# Patient Record
Sex: FEMALE | Race: WHITE | Employment: OTHER | URBAN - METROPOLITAN AREA
[De-identification: names, ages, dates, MRNs, and addresses within clinical notes are randomized per-mention and may not be internally consistent; named-entity substitution may affect disease eponyms.]

---

## 2016-12-21 LAB
VITAMIN D 25-HYDROXY: 36
VITAMIN D2, 25 HYDROXY: NORMAL
VITAMIN D3,25 HYDROXY: NORMAL

## 2017-01-04 DIAGNOSIS — Z12.11 SPECIAL SCREENING FOR MALIGNANT NEOPLASMS, COLON: ICD-10-CM

## 2017-01-04 LAB
HEMOCCULT STL QL: NORMAL

## 2017-01-04 PROCEDURE — 82270 OCCULT BLOOD FECES: CPT | Performed by: INTERNAL MEDICINE

## 2017-01-19 ENCOUNTER — TELEPHONE (OUTPATIENT)
Dept: INTERNAL MEDICINE | Age: 72
End: 2017-01-19

## 2017-02-23 RX ORDER — LEVOTHYROXINE SODIUM 0.05 MG/1
TABLET ORAL
Qty: 90 TABLET | Refills: 3 | Status: SHIPPED | OUTPATIENT
Start: 2017-02-23 | End: 2017-12-02 | Stop reason: SDUPTHER

## 2017-03-29 ENCOUNTER — TELEPHONE (OUTPATIENT)
Dept: INTERNAL MEDICINE | Age: 72
End: 2017-03-29

## 2017-04-07 RX ORDER — LISINOPRIL 40 MG/1
TABLET ORAL
Qty: 180 TABLET | Refills: 3 | Status: ON HOLD | OUTPATIENT
Start: 2017-04-07 | End: 2017-07-11

## 2017-05-08 ENCOUNTER — TELEPHONE (OUTPATIENT)
Dept: RHEUMATOLOGY | Age: 72
End: 2017-05-08

## 2017-05-08 RX ORDER — ATORVASTATIN CALCIUM 80 MG/1
TABLET, FILM COATED ORAL
Qty: 90 TABLET | Refills: 3 | Status: SHIPPED | OUTPATIENT
Start: 2017-05-08 | End: 2018-05-10 | Stop reason: SDUPTHER

## 2017-05-10 ENCOUNTER — OFFICE VISIT (OUTPATIENT)
Dept: RHEUMATOLOGY | Age: 72
End: 2017-05-10

## 2017-05-10 VITALS
DIASTOLIC BLOOD PRESSURE: 76 MMHG | BODY MASS INDEX: 29.66 KG/M2 | TEMPERATURE: 98.3 F | WEIGHT: 178 LBS | HEART RATE: 74 BPM | SYSTOLIC BLOOD PRESSURE: 124 MMHG | HEIGHT: 65 IN

## 2017-05-10 DIAGNOSIS — Z79.1 NSAID LONG-TERM USE: ICD-10-CM

## 2017-05-10 DIAGNOSIS — M15.9 PRIMARY OSTEOARTHRITIS INVOLVING MULTIPLE JOINTS: Primary | ICD-10-CM

## 2017-05-10 PROCEDURE — G8427 DOCREV CUR MEDS BY ELIG CLIN: HCPCS | Performed by: INTERNAL MEDICINE

## 2017-05-10 PROCEDURE — 4004F PT TOBACCO SCREEN RCVD TLK: CPT | Performed by: INTERNAL MEDICINE

## 2017-05-10 PROCEDURE — 3017F COLORECTAL CA SCREEN DOC REV: CPT | Performed by: INTERNAL MEDICINE

## 2017-05-10 PROCEDURE — 1123F ACP DISCUSS/DSCN MKR DOCD: CPT | Performed by: INTERNAL MEDICINE

## 2017-05-10 PROCEDURE — G8399 PT W/DXA RESULTS DOCUMENT: HCPCS | Performed by: INTERNAL MEDICINE

## 2017-05-10 PROCEDURE — G8420 CALC BMI NORM PARAMETERS: HCPCS | Performed by: INTERNAL MEDICINE

## 2017-05-10 PROCEDURE — 1090F PRES/ABSN URINE INCON ASSESS: CPT | Performed by: INTERNAL MEDICINE

## 2017-05-10 PROCEDURE — 3014F SCREEN MAMMO DOC REV: CPT | Performed by: INTERNAL MEDICINE

## 2017-05-10 PROCEDURE — 4040F PNEUMOC VAC/ADMIN/RCVD: CPT | Performed by: INTERNAL MEDICINE

## 2017-05-10 PROCEDURE — 99213 OFFICE O/P EST LOW 20 MIN: CPT | Performed by: INTERNAL MEDICINE

## 2017-06-01 ENCOUNTER — TELEPHONE (OUTPATIENT)
Dept: INTERNAL MEDICINE | Age: 72
End: 2017-06-01

## 2017-06-08 ENCOUNTER — TELEPHONE (OUTPATIENT)
Dept: INTERNAL MEDICINE | Age: 72
End: 2017-06-08

## 2017-06-13 ENCOUNTER — TELEPHONE (OUTPATIENT)
Dept: INTERNAL MEDICINE | Age: 72
End: 2017-06-13

## 2017-06-21 ENCOUNTER — TELEPHONE (OUTPATIENT)
Dept: INTERNAL MEDICINE | Age: 72
End: 2017-06-21

## 2017-06-21 DIAGNOSIS — R05.9 COUGH: Primary | ICD-10-CM

## 2017-06-22 ENCOUNTER — TELEPHONE (OUTPATIENT)
Dept: INTERNAL MEDICINE | Age: 72
End: 2017-06-22

## 2017-06-22 RX ORDER — ALPRAZOLAM 1 MG/1
1 TABLET ORAL 2 TIMES DAILY
Qty: 22 TABLET | Refills: 0 | Status: CANCELLED | OUTPATIENT
Start: 2017-06-22 | End: 2017-07-22

## 2017-06-22 RX ORDER — ALPRAZOLAM 1 MG/1
1 TABLET ORAL 2 TIMES DAILY
Qty: 22 TABLET | Refills: 0 | Status: SHIPPED | OUTPATIENT
Start: 2017-06-22 | End: 2017-07-22

## 2017-06-28 RX ORDER — ATENOLOL 50 MG/1
TABLET ORAL
Qty: 90 TABLET | Refills: 3 | Status: ON HOLD | OUTPATIENT
Start: 2017-06-28 | End: 2017-07-11 | Stop reason: HOSPADM

## 2017-07-07 ENCOUNTER — TELEPHONE (OUTPATIENT)
Dept: INTERNAL MEDICINE | Age: 72
End: 2017-07-07

## 2017-07-08 PROBLEM — R26.81 UNSTEADY GAIT: Status: ACTIVE | Noted: 2017-07-08

## 2017-07-13 ENCOUNTER — TELEPHONE (OUTPATIENT)
Dept: PHARMACY | Facility: CLINIC | Age: 72
End: 2017-07-13

## 2017-07-14 ENCOUNTER — TELEPHONE (OUTPATIENT)
Dept: INTERNAL MEDICINE | Age: 72
End: 2017-07-14

## 2017-07-14 RX ORDER — CETIRIZINE HYDROCHLORIDE 10 MG/1
10 TABLET ORAL DAILY
COMMUNITY

## 2017-07-31 RX ORDER — AMLODIPINE BESYLATE 10 MG/1
10 TABLET ORAL DAILY
Qty: 90 TABLET | Refills: 3 | Status: SHIPPED | OUTPATIENT
Start: 2017-07-31 | End: 2018-09-28 | Stop reason: SDUPTHER

## 2017-11-14 ENCOUNTER — OFFICE VISIT (OUTPATIENT)
Dept: RHEUMATOLOGY | Age: 72
End: 2017-11-14

## 2017-11-14 VITALS
DIASTOLIC BLOOD PRESSURE: 72 MMHG | TEMPERATURE: 98.3 F | WEIGHT: 179 LBS | HEIGHT: 65 IN | SYSTOLIC BLOOD PRESSURE: 120 MMHG | BODY MASS INDEX: 29.82 KG/M2

## 2017-11-14 DIAGNOSIS — Z79.1 NSAID LONG-TERM USE: ICD-10-CM

## 2017-11-14 DIAGNOSIS — M15.9 PRIMARY OSTEOARTHRITIS INVOLVING MULTIPLE JOINTS: Primary | ICD-10-CM

## 2017-11-14 PROCEDURE — 1090F PRES/ABSN URINE INCON ASSESS: CPT | Performed by: INTERNAL MEDICINE

## 2017-11-14 PROCEDURE — G8417 CALC BMI ABV UP PARAM F/U: HCPCS | Performed by: INTERNAL MEDICINE

## 2017-11-14 PROCEDURE — 4004F PT TOBACCO SCREEN RCVD TLK: CPT | Performed by: INTERNAL MEDICINE

## 2017-11-14 PROCEDURE — 99213 OFFICE O/P EST LOW 20 MIN: CPT | Performed by: INTERNAL MEDICINE

## 2017-11-14 PROCEDURE — 1123F ACP DISCUSS/DSCN MKR DOCD: CPT | Performed by: INTERNAL MEDICINE

## 2017-11-14 PROCEDURE — G8399 PT W/DXA RESULTS DOCUMENT: HCPCS | Performed by: INTERNAL MEDICINE

## 2017-11-14 PROCEDURE — 3014F SCREEN MAMMO DOC REV: CPT | Performed by: INTERNAL MEDICINE

## 2017-11-14 PROCEDURE — 4040F PNEUMOC VAC/ADMIN/RCVD: CPT | Performed by: INTERNAL MEDICINE

## 2017-11-14 PROCEDURE — G8427 DOCREV CUR MEDS BY ELIG CLIN: HCPCS | Performed by: INTERNAL MEDICINE

## 2017-11-14 PROCEDURE — 3017F COLORECTAL CA SCREEN DOC REV: CPT | Performed by: INTERNAL MEDICINE

## 2017-11-14 PROCEDURE — G8484 FLU IMMUNIZE NO ADMIN: HCPCS | Performed by: INTERNAL MEDICINE

## 2017-11-14 NOTE — PROGRESS NOTES
Component Value Date    WBC 7.9 07/08/2017    RBC 4.69 07/08/2017    HGB 13.3 07/08/2017    HCT 41.2 07/08/2017     07/08/2017    MCV 87.9 07/08/2017    MCH 28.4 07/08/2017    MCHC 32.3 07/08/2017    RDW 15.1 07/08/2017    SEGSPCT 53.1 07/26/2012    LYMPHOPCT 14.9 07/08/2017    MONOPCT 7.7 07/08/2017    EOSPCT 1.2 12/21/2016    BASOPCT 0.5 07/08/2017    MONOSABS 0.6 07/08/2017    LYMPHSABS 1.2 07/08/2017    EOSABS 0.0 07/08/2017    BASOSABS 0.0 07/08/2017       Chemistry        Component Value Date/Time     11/09/2017 1438    K 4.4 11/09/2017 1438     11/09/2017 1438    CO2 24 11/09/2017 1438    BUN 19 11/09/2017 1438    CREATININE 0.9 11/09/2017 1438        Component Value Date/Time    CALCIUM 9.1 11/09/2017 1438    ALKPHOS 81 12/21/2016    AST 18 12/21/2016    ALT 16 12/21/2016    BILITOT 0.20 12/21/2016             I thank you for giving me the opportunity to be involved in [facility] care and I look forward following [facility] along with you. If you have any questions or concerns please feel free to contact me at any time. Napoleon Brewer MD 11/14/17         NOTE: This report was transcribed using voice recognition software. Every effort was made to ensure accuracy; however, inadvertent computerized transcription errors may be present.

## 2017-12-02 RX ORDER — LEVOTHYROXINE SODIUM 0.05 MG/1
TABLET ORAL
Qty: 90 TABLET | Refills: 3 | Status: SHIPPED | OUTPATIENT
Start: 2017-12-02 | End: 2018-03-01 | Stop reason: SDUPTHER

## 2018-02-02 ENCOUNTER — TELEPHONE (OUTPATIENT)
Dept: INTERNAL MEDICINE | Age: 73
End: 2018-02-02

## 2018-03-01 RX ORDER — LEVOTHYROXINE SODIUM 0.05 MG/1
TABLET ORAL
Qty: 90 TABLET | Refills: 3 | Status: SHIPPED | OUTPATIENT
Start: 2018-03-01 | End: 2019-03-02 | Stop reason: SDUPTHER

## 2018-04-26 PROBLEM — R26.81 UNSTEADY GAIT: Status: RESOLVED | Noted: 2017-07-08 | Resolved: 2018-04-26

## 2018-05-04 ENCOUNTER — OFFICE VISIT (OUTPATIENT)
Dept: INTERNAL MEDICINE | Age: 73
End: 2018-05-04

## 2018-05-04 VITALS
RESPIRATION RATE: 12 BRPM | SYSTOLIC BLOOD PRESSURE: 126 MMHG | BODY MASS INDEX: 28.66 KG/M2 | DIASTOLIC BLOOD PRESSURE: 70 MMHG | WEIGHT: 172 LBS | HEIGHT: 65 IN | HEART RATE: 60 BPM

## 2018-05-04 DIAGNOSIS — Z00.00 MEDICARE ANNUAL WELLNESS VISIT, SUBSEQUENT: Primary | ICD-10-CM

## 2018-05-04 DIAGNOSIS — E78.5 HYPERLIPIDEMIA, UNSPECIFIED HYPERLIPIDEMIA TYPE: ICD-10-CM

## 2018-05-04 DIAGNOSIS — E55.9 VITAMIN D DEFICIENCY DISEASE: ICD-10-CM

## 2018-05-04 DIAGNOSIS — Z12.11 SPECIAL SCREENING FOR MALIGNANT NEOPLASMS, COLON: ICD-10-CM

## 2018-05-04 DIAGNOSIS — N39.0 URINARY TRACT INFECTION WITHOUT HEMATURIA, SITE UNSPECIFIED: Primary | ICD-10-CM

## 2018-05-04 DIAGNOSIS — E03.9 HYPOTHYROIDISM, UNSPECIFIED TYPE: ICD-10-CM

## 2018-05-04 DIAGNOSIS — Z23 NEED FOR VACCINATION WITH 13-POLYVALENT PNEUMOCOCCAL CONJUGATE VACCINE: ICD-10-CM

## 2018-05-04 LAB
BILIRUBIN, POC: ABNORMAL
BLOOD URINE, POC: ABNORMAL
CLARITY, POC: ABNORMAL
COLOR, POC: YELLOW
GLUCOSE URINE, POC: ABNORMAL
KETONES, POC: ABNORMAL
LEUKOCYTE EST, POC: ABNORMAL
NITRITE, POC: ABNORMAL
PH, POC: 5
PROTEIN, POC: ABNORMAL
SPECIFIC GRAVITY, POC: 1.02
UROBILINOGEN, POC: ABNORMAL

## 2018-05-04 PROCEDURE — 81002 URINALYSIS NONAUTO W/O SCOPE: CPT | Performed by: INTERNAL MEDICINE

## 2018-05-04 PROCEDURE — 90670 PCV13 VACCINE IM: CPT | Performed by: INTERNAL MEDICINE

## 2018-05-04 PROCEDURE — G0009 ADMIN PNEUMOCOCCAL VACCINE: HCPCS | Performed by: INTERNAL MEDICINE

## 2018-05-04 PROCEDURE — 4040F PNEUMOC VAC/ADMIN/RCVD: CPT | Performed by: INTERNAL MEDICINE

## 2018-05-04 PROCEDURE — 93000 ELECTROCARDIOGRAM COMPLETE: CPT | Performed by: INTERNAL MEDICINE

## 2018-05-04 PROCEDURE — G0439 PPPS, SUBSEQ VISIT: HCPCS | Performed by: INTERNAL MEDICINE

## 2018-05-04 ASSESSMENT — ANXIETY QUESTIONNAIRES: GAD7 TOTAL SCORE: 1

## 2018-05-04 ASSESSMENT — LIFESTYLE VARIABLES: HOW OFTEN DO YOU HAVE A DRINK CONTAINING ALCOHOL: 0

## 2018-05-04 ASSESSMENT — PATIENT HEALTH QUESTIONNAIRE - PHQ9: SUM OF ALL RESPONSES TO PHQ QUESTIONS 1-9: 1

## 2018-05-07 LAB
ORGANISM: ABNORMAL
URINE CULTURE, ROUTINE: ABNORMAL
URINE CULTURE, ROUTINE: ABNORMAL

## 2018-05-10 RX ORDER — ATORVASTATIN CALCIUM 80 MG/1
TABLET, FILM COATED ORAL
Qty: 90 TABLET | Refills: 3 | Status: SHIPPED | OUTPATIENT
Start: 2018-05-10 | End: 2019-07-31 | Stop reason: ALTCHOICE

## 2018-05-14 ENCOUNTER — TELEPHONE (OUTPATIENT)
Dept: INTERNAL MEDICINE | Age: 73
End: 2018-05-14

## 2018-05-24 ENCOUNTER — TELEPHONE (OUTPATIENT)
Dept: INTERNAL MEDICINE | Age: 73
End: 2018-05-24

## 2018-05-24 DIAGNOSIS — E03.9 HYPOTHYROIDISM, UNSPECIFIED TYPE: ICD-10-CM

## 2018-05-24 DIAGNOSIS — E78.5 HYPERLIPIDEMIA, UNSPECIFIED HYPERLIPIDEMIA TYPE: ICD-10-CM

## 2018-05-24 DIAGNOSIS — N39.0 URINARY TRACT INFECTION WITHOUT HEMATURIA, SITE UNSPECIFIED: ICD-10-CM

## 2018-05-24 DIAGNOSIS — N39.0 URINARY TRACT INFECTION WITHOUT HEMATURIA, SITE UNSPECIFIED: Primary | ICD-10-CM

## 2018-05-24 LAB
BACTERIA: ABNORMAL /HPF
BASOPHILS ABSOLUTE: 0 K/UL (ref 0–0.2)
BASOPHILS RELATIVE PERCENT: 0.7 %
BILIRUBIN URINE: NEGATIVE
BLOOD, URINE: NEGATIVE
CLARITY: CLEAR
COLOR: YELLOW
EOSINOPHILS ABSOLUTE: 0 K/UL (ref 0–0.6)
EOSINOPHILS RELATIVE PERCENT: 0.8 %
EPITHELIAL CELLS, UA: 2 /HPF (ref 0–5)
GLUCOSE URINE: NEGATIVE MG/DL
HCT VFR BLD CALC: 44.5 % (ref 36–48)
HEMOGLOBIN: 15 G/DL (ref 12–16)
HYALINE CASTS: 2 /LPF (ref 0–8)
KETONES, URINE: NEGATIVE MG/DL
LEUKOCYTE ESTERASE, URINE: NEGATIVE
LYMPHOCYTES ABSOLUTE: 1.6 K/UL (ref 1–5.1)
LYMPHOCYTES RELATIVE PERCENT: 28 %
MCH RBC QN AUTO: 30.7 PG (ref 26–34)
MCHC RBC AUTO-ENTMCNC: 33.6 G/DL (ref 31–36)
MCV RBC AUTO: 91.1 FL (ref 80–100)
MICROSCOPIC EXAMINATION: YES
MONOCYTES ABSOLUTE: 0.4 K/UL (ref 0–1.3)
MONOCYTES RELATIVE PERCENT: 7.4 %
NEUTROPHILS ABSOLUTE: 3.7 K/UL (ref 1.7–7.7)
NEUTROPHILS RELATIVE PERCENT: 63.1 %
NITRITE, URINE: POSITIVE
PDW BLD-RTO: 15.3 % (ref 12.4–15.4)
PH UA: 5
PLATELET # BLD: 202 K/UL (ref 135–450)
PMV BLD AUTO: 9.7 FL (ref 5–10.5)
PROTEIN UA: NEGATIVE MG/DL
RBC # BLD: 4.89 M/UL (ref 4–5.2)
RBC UA: 5 /HPF (ref 0–4)
SPECIFIC GRAVITY UA: 1.02
URINE TYPE: ABNORMAL
UROBILINOGEN, URINE: 0.2 E.U./DL
WBC # BLD: 5.8 K/UL (ref 4–11)
WBC UA: 2 /HPF (ref 0–5)

## 2018-05-25 LAB
ALBUMIN SERPL-MCNC: 4.6 G/DL (ref 3.4–5)
ALP BLD-CCNC: 100 U/L (ref 40–129)
ALT SERPL-CCNC: 16 U/L (ref 10–40)
AST SERPL-CCNC: 25 U/L (ref 15–37)
BILIRUB SERPL-MCNC: 0.3 MG/DL (ref 0–1)
BILIRUBIN DIRECT: <0.2 MG/DL (ref 0–0.3)
BILIRUBIN, INDIRECT: NORMAL MG/DL (ref 0–1)
CHOLESTEROL, TOTAL: 152 MG/DL (ref 0–199)
HDLC SERPL-MCNC: 57 MG/DL (ref 40–60)
LDL CHOLESTEROL CALCULATED: 47 MG/DL
T4 FREE: 1 NG/DL (ref 0.9–1.8)
TOTAL CK: 260 U/L (ref 26–192)
TOTAL PROTEIN: 7.2 G/DL (ref 6.4–8.2)
TRIGL SERPL-MCNC: 242 MG/DL (ref 0–150)
TSH SERPL DL<=0.05 MIU/L-ACNC: 3.48 UIU/ML (ref 0.27–4.2)
VLDLC SERPL CALC-MCNC: 48 MG/DL

## 2018-05-29 LAB
ORGANISM: ABNORMAL
URINE CULTURE, ROUTINE: ABNORMAL
URINE CULTURE, ROUTINE: ABNORMAL

## 2018-05-30 DIAGNOSIS — Z12.11 SPECIAL SCREENING FOR MALIGNANT NEOPLASMS, COLON: ICD-10-CM

## 2018-05-30 LAB
CONTROL: NORMAL
HEMOCCULT STL QL: NORMAL

## 2018-05-30 PROCEDURE — G0328 FECAL BLOOD SCRN IMMUNOASSAY: HCPCS | Performed by: INTERNAL MEDICINE

## 2018-06-14 ENCOUNTER — TELEPHONE (OUTPATIENT)
Dept: INTERNAL MEDICINE | Age: 73
End: 2018-06-14

## 2018-06-14 DIAGNOSIS — N39.0 URINARY TRACT INFECTION WITHOUT HEMATURIA, SITE UNSPECIFIED: Primary | ICD-10-CM

## 2018-06-15 LAB
BILIRUBIN, URINE: NEGATIVE
BLOOD, URINE: NEGATIVE
CLARITY: ABNORMAL
COLOR: YELLOW
GLUCOSE URINE: NEGATIVE
KETONES, URINE: NEGATIVE
LEUKOCYTE ESTERASE, URINE: NEGATIVE
NITRITE, URINE: POSITIVE
PH UA: 5.5 (ref 4.5–8)
PROTEIN UA: NEGATIVE
SPECIFIC GRAVITY, URINE: 1.03
UROBILINOGEN, URINE: NORMAL

## 2018-06-18 RX ORDER — NITROFURANTOIN 25; 75 MG/1; MG/1
100 CAPSULE ORAL 2 TIMES DAILY
COMMUNITY
End: 2018-06-18 | Stop reason: SDUPTHER

## 2018-06-18 RX ORDER — NITROFURANTOIN 25; 75 MG/1; MG/1
100 CAPSULE ORAL 2 TIMES DAILY
Qty: 20 CAPSULE | Refills: 0 | Status: SHIPPED | OUTPATIENT
Start: 2018-06-18 | End: 2019-06-11 | Stop reason: CLARIF

## 2018-07-09 DIAGNOSIS — R39.15 URINARY URGENCY: Primary | ICD-10-CM

## 2018-07-10 LAB
BILIRUBIN, URINE: NEGATIVE
BLOOD, URINE: NEGATIVE
CLARITY: ABNORMAL
COLOR: YELLOW
GLUCOSE URINE: NEGATIVE
KETONES, URINE: NEGATIVE
LEUKOCYTE ESTERASE, URINE: NEGATIVE
NITRITE, URINE: POSITIVE
PH UA: 5.5 (ref 4.5–8)
PROTEIN UA: NEGATIVE
SPECIFIC GRAVITY, URINE: 1.02
UROBILINOGEN, URINE: NORMAL

## 2018-07-11 DIAGNOSIS — R39.15 URINARY URGENCY: ICD-10-CM

## 2018-08-14 DIAGNOSIS — N18.30 CKD (CHRONIC KIDNEY DISEASE), STAGE III (HCC): ICD-10-CM

## 2018-08-14 LAB
ALBUMIN SERPL-MCNC: 4.6 G/DL (ref 3.4–5)
ANION GAP SERPL CALCULATED.3IONS-SCNC: 15 MMOL/L (ref 3–16)
BUN BLDV-MCNC: 14 MG/DL (ref 7–20)
CALCIUM SERPL-MCNC: 9.4 MG/DL (ref 8.3–10.6)
CHLORIDE BLD-SCNC: 106 MMOL/L (ref 99–110)
CO2: 21 MMOL/L (ref 21–32)
CREAT SERPL-MCNC: 0.9 MG/DL (ref 0.6–1.2)
GFR AFRICAN AMERICAN: >60
GFR NON-AFRICAN AMERICAN: >60
GLUCOSE BLD-MCNC: 90 MG/DL (ref 70–99)
PHOSPHORUS: 3.7 MG/DL (ref 2.5–4.9)
POTASSIUM SERPL-SCNC: 4.2 MMOL/L (ref 3.5–5.1)
SODIUM BLD-SCNC: 142 MMOL/L (ref 136–145)

## 2018-09-28 RX ORDER — AMLODIPINE BESYLATE 10 MG/1
10 TABLET ORAL DAILY
Qty: 90 TABLET | Refills: 3 | Status: SHIPPED | OUTPATIENT
Start: 2018-09-28 | End: 2018-10-16 | Stop reason: SDUPTHER

## 2018-10-01 ENCOUNTER — TELEPHONE (OUTPATIENT)
Dept: INTERNAL MEDICINE CLINIC | Age: 73
End: 2018-10-01

## 2018-10-01 NOTE — TELEPHONE ENCOUNTER
Her legs locked up on Friday   This has been happening  Her bl pres has been dropping to 100/ 60  She called Dr. Imtiaz Mandujano   He cut her Norvasc to 50 mg this am   She just went to get in the shower and her legs locked up again she took her blood pres a few [id number]    She wants to see you this week

## 2018-10-29 ENCOUNTER — TELEPHONE (OUTPATIENT)
Dept: INTERNAL MEDICINE CLINIC | Age: 73
End: 2018-10-29

## 2018-11-05 ENCOUNTER — TELEPHONE (OUTPATIENT)
Dept: INTERNAL MEDICINE CLINIC | Age: 73
End: 2018-11-05

## 2018-11-05 NOTE — TELEPHONE ENCOUNTER
She said she thought appt was tomorrow even though the computer called to remind her and she confirmed     She is in a lot of pain from the fall and wants to be seen asap like - tomorrow   How soon can you work her in?

## 2018-11-20 ENCOUNTER — HOSPITAL ENCOUNTER (OUTPATIENT)
Age: 73
Discharge: HOME | End: 2018-11-20
Payer: MEDICARE

## 2018-11-20 ENCOUNTER — OFFICE VISIT (OUTPATIENT)
Dept: INTERNAL MEDICINE CLINIC | Age: 73
End: 2018-11-20
Payer: MEDICARE

## 2018-11-20 ENCOUNTER — HOSPITAL ENCOUNTER (OUTPATIENT)
Dept: GENERAL RADIOLOGY | Age: 73
Discharge: HOME | End: 2018-11-20
Payer: MEDICARE

## 2018-11-20 VITALS
BODY MASS INDEX: 25.83 KG/M2 | HEART RATE: 80 BPM | WEIGHT: 155 LBS | SYSTOLIC BLOOD PRESSURE: 122 MMHG | RESPIRATION RATE: 12 BRPM | HEIGHT: 65 IN | DIASTOLIC BLOOD PRESSURE: 82 MMHG

## 2018-11-20 DIAGNOSIS — M54.5 ACUTE LOW BACK PAIN, UNSPECIFIED BACK PAIN LATERALITY, WITH SCIATICA PRESENCE UNSPECIFIED: Primary | ICD-10-CM

## 2018-11-20 DIAGNOSIS — M54.9 ACUTE BACK PAIN, UNSPECIFIED BACK LOCATION, UNSPECIFIED BACK PAIN LATERALITY: ICD-10-CM

## 2018-11-20 PROCEDURE — 72100 X-RAY EXAM L-S SPINE 2/3 VWS: CPT

## 2018-11-20 PROCEDURE — 73521 X-RAY EXAM HIPS BI 2 VIEWS: CPT

## 2018-11-20 PROCEDURE — 99213 OFFICE O/P EST LOW 20 MIN: CPT | Performed by: INTERNAL MEDICINE

## 2019-03-02 RX ORDER — LEVOTHYROXINE SODIUM 0.05 MG/1
TABLET ORAL
Qty: 90 TABLET | Refills: 3 | Status: SHIPPED | OUTPATIENT
Start: 2019-03-02 | End: 2020-04-05

## 2019-05-30 ENCOUNTER — TELEPHONE (OUTPATIENT)
Dept: INTERNAL MEDICINE CLINIC | Age: 74
End: 2019-05-30

## 2019-05-30 NOTE — TELEPHONE ENCOUNTER
The best thing would be to get an urgent appointment with a podiatrist (I like the [facility]) because even if it is not broken (and they can xray it in their office) she will need podiatric attention.

## 2019-06-11 ENCOUNTER — OFFICE VISIT (OUTPATIENT)
Dept: INTERNAL MEDICINE CLINIC | Age: 74
End: 2019-06-11
Payer: MEDICARE

## 2019-06-11 VITALS
HEART RATE: 48 BPM | HEIGHT: 65 IN | DIASTOLIC BLOOD PRESSURE: 76 MMHG | SYSTOLIC BLOOD PRESSURE: 122 MMHG | WEIGHT: 151 LBS | RESPIRATION RATE: 12 BRPM | BODY MASS INDEX: 25.16 KG/M2

## 2019-06-11 DIAGNOSIS — Z12.11 SPECIAL SCREENING FOR MALIGNANT NEOPLASMS, COLON: ICD-10-CM

## 2019-06-11 DIAGNOSIS — E78.5 HYPERLIPIDEMIA, UNSPECIFIED HYPERLIPIDEMIA TYPE: ICD-10-CM

## 2019-06-11 DIAGNOSIS — E55.9 VITAMIN D DEFICIENCY: ICD-10-CM

## 2019-06-11 DIAGNOSIS — E03.9 HYPOTHYROIDISM, UNSPECIFIED TYPE: ICD-10-CM

## 2019-06-11 DIAGNOSIS — Z00.00 MEDICARE ANNUAL WELLNESS VISIT, SUBSEQUENT: Primary | ICD-10-CM

## 2019-06-11 DIAGNOSIS — R82.998 LEUKOCYTES IN URINE: ICD-10-CM

## 2019-06-11 LAB
A/G RATIO: 1.7 (ref 1.1–2.2)
ALBUMIN SERPL-MCNC: 4.3 G/DL (ref 3.4–5)
ALP BLD-CCNC: 84 U/L (ref 40–129)
ALT SERPL-CCNC: 11 U/L (ref 10–40)
ANION GAP SERPL CALCULATED.3IONS-SCNC: 15 MMOL/L (ref 3–16)
AST SERPL-CCNC: 18 U/L (ref 15–37)
BASOPHILS ABSOLUTE: 0 K/UL (ref 0–0.2)
BASOPHILS RELATIVE PERCENT: 0.9 %
BILIRUB SERPL-MCNC: 0.4 MG/DL (ref 0–1)
BILIRUBIN, POC: ABNORMAL
BLOOD URINE, POC: ABNORMAL
BUN BLDV-MCNC: 15 MG/DL (ref 7–20)
CALCIUM SERPL-MCNC: 9.1 MG/DL (ref 8.3–10.6)
CHLORIDE BLD-SCNC: 111 MMOL/L (ref 99–110)
CHOLESTEROL, TOTAL: 142 MG/DL (ref 0–199)
CLARITY, POC: CLEAR
CO2: 17 MMOL/L (ref 21–32)
COLOR, POC: YELLOW
CREAT SERPL-MCNC: 1 MG/DL (ref 0.6–1.2)
EOSINOPHILS ABSOLUTE: 0 K/UL (ref 0–0.6)
EOSINOPHILS RELATIVE PERCENT: 0.7 %
GFR AFRICAN AMERICAN: >60
GFR NON-AFRICAN AMERICAN: 54
GLOBULIN: 2.5 G/DL
GLUCOSE BLD-MCNC: 93 MG/DL (ref 70–99)
GLUCOSE URINE, POC: ABNORMAL
HCT VFR BLD CALC: 38 % (ref 36–48)
HDLC SERPL-MCNC: 42 MG/DL (ref 40–60)
HEMOGLOBIN: 12.5 G/DL (ref 12–16)
KETONES, POC: ABNORMAL
LDL CHOLESTEROL CALCULATED: 59 MG/DL
LEUKOCYTE EST, POC: ABNORMAL
LYMPHOCYTES ABSOLUTE: 1.6 K/UL (ref 1–5.1)
LYMPHOCYTES RELATIVE PERCENT: 29.6 %
MCH RBC QN AUTO: 28.7 PG (ref 26–34)
MCHC RBC AUTO-ENTMCNC: 32.8 G/DL (ref 31–36)
MCV RBC AUTO: 87.4 FL (ref 80–100)
MONOCYTES ABSOLUTE: 0.5 K/UL (ref 0–1.3)
MONOCYTES RELATIVE PERCENT: 8.5 %
NEUTROPHILS ABSOLUTE: 3.2 K/UL (ref 1.7–7.7)
NEUTROPHILS RELATIVE PERCENT: 60.3 %
NITRITE, POC: ABNORMAL
PDW BLD-RTO: 14.4 % (ref 12.4–15.4)
PH, POC: 5
PLATELET # BLD: 186 K/UL (ref 135–450)
PMV BLD AUTO: 9.7 FL (ref 5–10.5)
POTASSIUM SERPL-SCNC: 4.3 MMOL/L (ref 3.5–5.1)
PROTEIN, POC: ABNORMAL
RBC # BLD: 4.35 M/UL (ref 4–5.2)
SODIUM BLD-SCNC: 143 MMOL/L (ref 136–145)
SPECIFIC GRAVITY, POC: 1.01
TOTAL CK: 133 U/L (ref 26–192)
TOTAL PROTEIN: 6.8 G/DL (ref 6.4–8.2)
TRIGL SERPL-MCNC: 204 MG/DL (ref 0–150)
TSH SERPL DL<=0.05 MIU/L-ACNC: 2.97 UIU/ML (ref 0.27–4.2)
UROBILINOGEN, POC: ABNORMAL
VLDLC SERPL CALC-MCNC: 41 MG/DL
WBC # BLD: 5.4 K/UL (ref 4–11)

## 2019-06-11 PROCEDURE — 3017F COLORECTAL CA SCREEN DOC REV: CPT | Performed by: INTERNAL MEDICINE

## 2019-06-11 PROCEDURE — 1123F ACP DISCUSS/DSCN MKR DOCD: CPT | Performed by: INTERNAL MEDICINE

## 2019-06-11 PROCEDURE — 93000 ELECTROCARDIOGRAM COMPLETE: CPT | Performed by: INTERNAL MEDICINE

## 2019-06-11 PROCEDURE — 81002 URINALYSIS NONAUTO W/O SCOPE: CPT | Performed by: INTERNAL MEDICINE

## 2019-06-11 PROCEDURE — G0439 PPPS, SUBSEQ VISIT: HCPCS | Performed by: INTERNAL MEDICINE

## 2019-06-11 PROCEDURE — 4040F PNEUMOC VAC/ADMIN/RCVD: CPT | Performed by: INTERNAL MEDICINE

## 2019-06-11 ASSESSMENT — LIFESTYLE VARIABLES
HOW OFTEN DURING THE LAST YEAR HAVE YOU FOUND THAT YOU WERE NOT ABLE TO STOP DRINKING ONCE YOU HAD STARTED: 0
HOW MANY STANDARD DRINKS CONTAINING ALCOHOL DO YOU HAVE ON A TYPICAL DAY: 0
HOW OFTEN DURING THE LAST YEAR HAVE YOU NEEDED AN ALCOHOLIC DRINK FIRST THING IN THE MORNING TO GET YOURSELF GOING AFTER A NIGHT OF HEAVY DRINKING: 0
HOW OFTEN DURING THE LAST YEAR HAVE YOU BEEN UNABLE TO REMEMBER WHAT HAPPENED THE NIGHT BEFORE BECAUSE YOU HAD BEEN DRINKING: 0
HOW OFTEN DO YOU HAVE SIX OR MORE DRINKS ON ONE OCCASION: 0
HAS A RELATIVE, FRIEND, DOCTOR, OR ANOTHER HEALTH PROFESSIONAL EXPRESSED CONCERN ABOUT YOUR DRINKING OR SUGGESTED YOU CUT DOWN: 0
AUDIT-C TOTAL SCORE: 2
HOW OFTEN DO YOU HAVE A DRINK CONTAINING ALCOHOL: 2
HAVE YOU OR SOMEONE ELSE BEEN INJURED AS A RESULT OF YOUR DRINKING: 0
AUDIT TOTAL SCORE: 2
HOW OFTEN DURING THE LAST YEAR HAVE YOU FAILED TO DO WHAT WAS NORMALLY EXPECTED FROM YOU BECAUSE OF DRINKING: 0
HOW OFTEN DURING THE LAST YEAR HAVE YOU HAD A FEELING OF GUILT OR REMORSE AFTER DRINKING: 0

## 2019-06-11 ASSESSMENT — PATIENT HEALTH QUESTIONNAIRE - PHQ9
SUM OF ALL RESPONSES TO PHQ QUESTIONS 1-9: 2
SUM OF ALL RESPONSES TO PHQ QUESTIONS 1-9: 2

## 2019-06-11 ASSESSMENT — ANXIETY QUESTIONNAIRES: GAD7 TOTAL SCORE: 1

## 2019-06-11 NOTE — PROGRESS NOTES
Kiesha Frankel 69 y.o. presents today with   Chief Complaint   Patient presents with    Medicare AWV       Family History   Problem Relation Age of Onset    Cancer Father 64        , carcinoma of the lung.  Other Mother 64        , suicide       Social History     Socioeconomic History    Marital status:      Spouse name: Damaso Craven Number of children: 1    Years of education: Not on file    Highest education level: Not on file   Occupational History    Occupation: She is RN   Social Needs    Financial resource strain: Not on file    Food insecurity:     Worry: Not on file     Inability: Not on file   Memoir needs:     Medical: Not on file     Non-medical: Not on file   Tobacco Use    Smoking status: Former Smoker     Packs/day: 0.20     Years: 20.00     Pack years: 4.00     Types: Cigarettes     Last attempt to quit: 1989     Years since quittin.4    Smokeless tobacco: Never Used   Substance and Sexual Activity    Alcohol use: No     Alcohol/week: 0.0 oz     Comment: rare if ever alcohol    Drug use: No    Sexual activity: Not on file   Lifestyle    Physical activity:     Days per week: Not on file     Minutes per session: Not on file    Stress: Not on file   Relationships    Social connections:     Talks on phone: Not on file     Gets together: Not on file     Attends [employer] service: Not on file     Active member of club or organization: Not on file     Attends meetings of clubs or organizations: Not on file     Relationship status: Not on file    Intimate partner violence:     Fear of current or ex partner: Not on file     Emotionally abused: Not on file     Physically abused: Not on file     Forced sexual activity: Not on file   Other Topics Concern    Not on file   Social History Narrative    Living Will:  Yes.                Patient Active Problem List   Diagnosis    Hyperlipidemia    Hypothyroidism, unspecified type       Past Medical History: Diagnosis Date    Allergic rhinitis     Depression     Diverticulosis     Fibrocystic disease of breast     Hyperlipidemia     Hypothyroidism     Osteopenia DEXA - Oct., 2011    Lumbar T score 1.3 and Hip T score -2.6    Osteopenia DEXA - Nov., 2013    Lumbar T score + 1.9 & Hip T score - 1.2    Other screening mammogram October 6, 2011    Negative    Other screening mammogram October 17, 2012    Benign    Other screening mammogram December 27, 2013    Negative    Other screening mammogram December 29, 2014    Negative    Other screening mammogram Febr. 22, 2016    Benign    Screening mammogram, encounter for *February 27, 2017    Benign    Screening mammogram, encounter for *March 29, 2018    Benign    Screening mammogram, encounter for *April 4, 2019    Benign    Vitamin D deficiency disease 2013    Level - 17        Past Surgical History:   Procedure Laterality Date    BACK SURGERY       X 2    BLADDER SUSPENSION  Nov., 2001    CATARACT REMOVAL  June, 2006    Right    CATARACT REMOVAL  July, 2007    Left    CHOLECYSTECTOMY, LAPAROSCOPIC  June, 2002    COLONOSCOPY  Oct., 2007 ( 2017 )    Dr. Efraín Barrett - single diverticula    COLONOSCOPY  Aug., 2012 ( 2017 )    Dr. Janna Owens - tubular adenoma    COLONOSCOPY  Jan., 2016 ( 2026 )    Dr. Janna Owens - dense diverticulosis    CYSTOSCOPY  Oct., 2014    Dr. Xuan Medley  Nov., 2009    Dr. Adamson Certain  Dec., [facility]  Nov., 2010    Dr. Phil Murillo  May, 2001 ( 2006 )    Diverticulosis    ELIAS AND BSO      TOE SURGERY  Oct. 2007    Biopsy Right toe mass - no significant drainage.     TOTAL HIP ARTHROPLASTY  August, 2012     Dr. Michael Padilla - left    UPPER GASTROINTESTINAL ENDOSCOPY  August, 2012    Dr. Janna Owens- small biopsies negative, nl esoph, mild gastritis    UPPER GASTROINTESTINAL ENDOSCOPY  Jan.30, 2015    Dr. Janna Owens - moderate gastritis with coffee grounds, mild duodenitis    UPPER GASTROINTESTINAL Polysaccharide ([id number]) 02/18/2013    Td, unspecified formulation 07/30/2007    Tdap (Boostrix, Adacel) 05/05/2018, 05/05/2019    Zoster Live (Zostavax) 03/01/2013    Zoster Subunit (Shingrix) 10/20/2018     Eye Exam:  April, 2019 by Dr. Rachel Cabral  Chest: Denies: cough, hemoptysis, shortness of breath, pleuritic chest pain, wheezing  Cardiovascular: Denies: chest pain, dyspnea on exertion, orthopnea, paroxysmal nocturnal dyspnea, edema, palpitations  Abdomen: no abdominal pain, change in bowel habits, or black or bloody stools  Colonoscopy:  January, 2016 by Dr. Gianluca Dejesus revealed dense diverticulosis. To be repeared 2026  Fall Risk low  ADL   Without assistance   Mammography:April 4, 2019  DEXA: November, 2013 revealed a lumbar T score of +1.9 and a hip T score of -1.2  Pelvic and PAP: Overdue  Other:  N/A      Physical Exam:  General appearance: alert, appears stated age and cooperative  /77 (Site: Left Upper Arm, Position: Sitting, Cuff Size: Medium Adult)   Pulse (!) 48   Resp 12   Ht 5' 5\" (1.651 m)   Wt 151 lb (68.5 kg)   BMI 25.13 kg/m²   Eyes: conjunctivae/corneas clear. PERRL, EOM's intact. Fundi benign. Ears: normal TM's and external ear canals both ears  Nose: Nares normal. Septum midline. Mucosa normal. No drainage or sinus tenderness.   Throat: no abnormalities  Neck: no adenopathy, no carotid bruit, no JVD, supple, symmetrical, trachea midline and thyroid not enlarged, symmetric, no tenderness/mass/nodules  Nodes:no adenopathy palpable  Breasts:Breasts symmetrical, skin without lesion(s), no nipple retraction or dimpling, no nipple discharge, no masses palpated, no axillary or supraclavicular adenopathy  Lungs: clear to auscultation bilaterally  Heart: regular rate and rhythm, S1, S2 normal, no murmur, click, rub or gallop  Abdomen: soft, non-tender; bowel sounds normal; no masses,  no organomegaly  Extremities: extremities normal, atraumatic, no cyanosis or edema  Neurological: Gait normal. Reflexes normal and symmetric. Sensation grossly normal  Pulse: radial=4/4, femoral=4/4, popliteal=4/4, dorsalis pedis=4/4,   Skin: normal coloration and turgor, no rashes, no suspicious skin lesions noted  Psych: normal    Lab Review: not applicable  Assessment: Stable    Plan:              ECG, UA, fasting labs, and hemoccults  Hyperlipidemia - await labs, same regimen pending results    Hypothyroidism - await labs, same regimen pending results    Falls - mechanical, continue to monitor  Anger - ongoing, continue medicines managed by Dr. Blue Braun psychiatrist  Pain - secondary to fallm continue same regimen  Meals - chronic, continue to monitor  Hearing - chronic, continue to monitor  Bannisters - ongoing, continue to use the walls for stability     SABRINA Chau MD

## 2019-06-14 LAB
ORGANISM: ABNORMAL
URINE CULTURE, ROUTINE: ABNORMAL
URINE CULTURE, ROUTINE: ABNORMAL

## 2019-06-14 RX ORDER — CIPROFLOXACIN 250 MG/1
250 TABLET, FILM COATED ORAL 2 TIMES DAILY
COMMUNITY
End: 2019-06-14 | Stop reason: SDUPTHER

## 2019-06-14 RX ORDER — CIPROFLOXACIN 250 MG/1
250 TABLET, FILM COATED ORAL 2 TIMES DAILY
Qty: 15 TABLET | Refills: 0 | Status: SHIPPED | OUTPATIENT
Start: 2019-06-14 | End: 2019-07-31 | Stop reason: ALTCHOICE

## 2019-07-09 ENCOUNTER — TELEPHONE (OUTPATIENT)
Dept: INTERNAL MEDICINE CLINIC | Age: 74
End: 2019-07-09

## 2019-07-09 DIAGNOSIS — Z12.11 SPECIAL SCREENING FOR MALIGNANT NEOPLASMS, COLON: ICD-10-CM

## 2019-07-09 LAB
CONTROL: NORMAL
HEMOCCULT STL QL: NORMAL

## 2019-07-09 PROCEDURE — 82274 ASSAY TEST FOR BLOOD FECAL: CPT | Performed by: INTERNAL MEDICINE

## 2019-07-31 ENCOUNTER — OFFICE VISIT (OUTPATIENT)
Dept: RHEUMATOLOGY | Age: 74
End: 2019-07-31
Payer: MEDICARE

## 2019-07-31 VITALS
DIASTOLIC BLOOD PRESSURE: 76 MMHG | BODY MASS INDEX: 24.66 KG/M2 | HEIGHT: 65 IN | SYSTOLIC BLOOD PRESSURE: 120 MMHG | WEIGHT: 148 LBS

## 2019-07-31 DIAGNOSIS — M15.9 PRIMARY OSTEOARTHRITIS INVOLVING MULTIPLE JOINTS: Primary | ICD-10-CM

## 2019-07-31 PROCEDURE — 4040F PNEUMOC VAC/ADMIN/RCVD: CPT | Performed by: INTERNAL MEDICINE

## 2019-07-31 PROCEDURE — G8427 DOCREV CUR MEDS BY ELIG CLIN: HCPCS | Performed by: INTERNAL MEDICINE

## 2019-07-31 PROCEDURE — 99213 OFFICE O/P EST LOW 20 MIN: CPT | Performed by: INTERNAL MEDICINE

## 2019-07-31 PROCEDURE — G8420 CALC BMI NORM PARAMETERS: HCPCS | Performed by: INTERNAL MEDICINE

## 2019-07-31 PROCEDURE — 1123F ACP DISCUSS/DSCN MKR DOCD: CPT | Performed by: INTERNAL MEDICINE

## 2019-07-31 PROCEDURE — 1036F TOBACCO NON-USER: CPT | Performed by: INTERNAL MEDICINE

## 2019-07-31 PROCEDURE — 3017F COLORECTAL CA SCREEN DOC REV: CPT | Performed by: INTERNAL MEDICINE

## 2019-07-31 PROCEDURE — 1090F PRES/ABSN URINE INCON ASSESS: CPT | Performed by: INTERNAL MEDICINE

## 2019-07-31 PROCEDURE — G8399 PT W/DXA RESULTS DOCUMENT: HCPCS | Performed by: INTERNAL MEDICINE

## 2019-07-31 NOTE — PROGRESS NOTES
[address] Maria MD                                                 [address] [address] [place] [phone] (j) [phone] (W)      Primary provider: Zoila Childers MD  Patient identification: Camelia Montana,: 1945,Sex: female     ASSESSMENT:  1. Primary osteoarthritis involving multiple joints      OA -intermittent migratory joint pain. Tylenol arthritis helps. Wonders about any other treatment options. Unable to take NSAIDs because of fluctuating creatinine. Plan-  I gave her diclofenac gel to apply topically in affected joints. Activities as tolerated. If symptoms are persistent, Cymbalta is an option. She is not keen on adding any other medications, is able to manage discomfort with Tylenol arthritis. She was advised to call me for follow-up with any symptoms, follow-up PRN. Patient indicates understanding and agrees with the management plan. I reviewed patients medical information and medical history in the medical records. ##################################################################    Subjective: Follow for generalized osteoarthritis  Last seen couple of years ago, is here for follow-up. She continues to have intercurrent arthralgias-different joints affected at different times, DIP joints, first MTP joint-dull ache, typically takes Tylenol arthritis which helps. No swollen or inflamed joints. ADLs and recreational activities are normal.  She continues to dance, sometimes is difficult. She is not on any NSAIDs because of fluctuating creatinine. She has a long list of dietary restrictions given by nephrology, states that she avoids all was written in the paper. All other review of systems are negative. Past medical, surgical, family history, meds- reviewed.     OBJECTIVE  Physical

## 2019-11-18 RX ORDER — AMLODIPINE BESYLATE 10 MG/1
TABLET ORAL
Qty: 90 TABLET | Refills: 3 | Status: SHIPPED | OUTPATIENT
Start: 2019-11-18 | End: 2020-03-10 | Stop reason: ALTCHOICE

## 2020-03-05 LAB
BUN BLDV-MCNC: 21 MG/DL
CALCIUM SERPL-MCNC: 9 MG/DL
CHLORIDE BLD-SCNC: 112 MMOL/L
CO2: 25 MMOL/L
CREAT SERPL-MCNC: 1 MG/DL
GFR CALCULATED: NORMAL
GLUCOSE BLD-MCNC: 87 MG/DL
POTASSIUM SERPL-SCNC: 4 MMOL/L
SODIUM BLD-SCNC: 140 MMOL/L

## 2020-04-05 RX ORDER — LEVOTHYROXINE SODIUM 0.05 MG/1
TABLET ORAL
Qty: 90 TABLET | Refills: 3 | Status: SHIPPED | OUTPATIENT
Start: 2020-04-05 | End: 2020-04-06 | Stop reason: SDUPTHER

## 2020-04-06 RX ORDER — LEVOTHYROXINE SODIUM 0.05 MG/1
50 TABLET ORAL DAILY
Qty: 90 TABLET | Refills: 3 | Status: SHIPPED | OUTPATIENT
Start: 2020-04-06 | End: 2021-07-06 | Stop reason: SDUPTHER

## 2020-08-03 ENCOUNTER — TELEPHONE (OUTPATIENT)
Dept: INTERNAL MEDICINE CLINIC | Age: 75
End: 2020-08-03

## 2020-08-03 NOTE — TELEPHONE ENCOUNTER
Had her AWV 6/2019. Does she need another one? FYI - Has got clostridium. She is being treated with [facility] - Dr. Nataly Ramirez' office. This is the third antibiotic she's been on in 3 months. Almost finished with the flagyl. Was on clindamycin 500 bid for her tooth, then gerivantin per Dr. Destiney Valerio and now this.

## 2020-09-10 ENCOUNTER — TELEPHONE (OUTPATIENT)
Dept: INTERNAL MEDICINE CLINIC | Age: 75
End: 2020-09-10

## 2020-09-10 NOTE — TELEPHONE ENCOUNTER
I would call the [phone] [place] [address] number and find out which PCPs are taking new patients

## 2020-11-13 ENCOUNTER — VIRTUAL VISIT (OUTPATIENT)
Dept: RHEUMATOLOGY | Age: 75
End: 2020-11-13
Payer: MEDICARE

## 2020-11-13 PROCEDURE — 99442 PR PHYS/QHP TELEPHONE EVALUATION 11-20 MIN: CPT | Performed by: INTERNAL MEDICINE

## 2020-11-19 LAB
ALBUMIN SERPL-MCNC: 3.4 G/DL
BUN / CREAT RATIO: 16.7
BUN BLDV-MCNC: 20 MG/DL
CALCIUM SERPL-MCNC: 8.8 MG/DL
CHLORIDE BLD-SCNC: 113 MMOL/L
CO2: 24 MMOL/L
CREAT SERPL-MCNC: 1.2 MG/DL
GLUCOSE: 90
PHOSPHORUS: 3.4 MG/DL
POTASSIUM SERPL-SCNC: 4.3 MMOL/L
SODIUM BLD-SCNC: 143 MMOL/L

## 2020-11-20 DIAGNOSIS — I15.8 OTHER SECONDARY HYPERTENSION: ICD-10-CM

## 2020-11-21 LAB
ALBUMIN SERPL-MCNC: 4.5 G/DL (ref 3.4–5)
ANION GAP SERPL CALCULATED.3IONS-SCNC: 10 MMOL/L (ref 3–16)
BUN BLDV-MCNC: 18 MG/DL (ref 7–20)
CALCIUM SERPL-MCNC: 9.7 MG/DL (ref 8.3–10.6)
CHLORIDE BLD-SCNC: 111 MMOL/L (ref 99–110)
CO2: 24 MMOL/L (ref 21–32)
CREAT SERPL-MCNC: 0.8 MG/DL (ref 0.6–1.2)
GFR AFRICAN AMERICAN: >60
GFR NON-AFRICAN AMERICAN: >60
GLUCOSE BLD-MCNC: 88 MG/DL (ref 70–99)
PHOSPHORUS: 3.3 MG/DL (ref 2.5–4.9)
POTASSIUM SERPL-SCNC: 4.1 MMOL/L (ref 3.5–5.1)
SODIUM BLD-SCNC: 145 MMOL/L (ref 136–145)

## 2021-03-29 NOTE — TELEPHONE ENCOUNTER
Pt called requesting refill on Diclofenac gel, Rx pended for approval     Last OV 11/30/20  Future OV 4/5/21  Recent Labs 11/20/20

## 2021-04-05 ENCOUNTER — OFFICE VISIT (OUTPATIENT)
Dept: RHEUMATOLOGY | Age: 76
End: 2021-04-05
Payer: MEDICARE

## 2021-04-05 VITALS — BODY MASS INDEX: 28.99 KG/M2 | TEMPERATURE: 96.9 F | HEIGHT: 65 IN | WEIGHT: 174 LBS

## 2021-04-05 DIAGNOSIS — M81.0 SENILE OSTEOPOROSIS: ICD-10-CM

## 2021-04-05 DIAGNOSIS — M15.9 GENERALIZED OSTEOARTHRITIS: Primary | ICD-10-CM

## 2021-04-05 DIAGNOSIS — R20.2 PARESTHESIA OF BOTH HANDS: ICD-10-CM

## 2021-04-05 PROCEDURE — G8417 CALC BMI ABV UP PARAM F/U: HCPCS | Performed by: INTERNAL MEDICINE

## 2021-04-05 PROCEDURE — G8399 PT W/DXA RESULTS DOCUMENT: HCPCS | Performed by: INTERNAL MEDICINE

## 2021-04-05 PROCEDURE — 1036F TOBACCO NON-USER: CPT | Performed by: INTERNAL MEDICINE

## 2021-04-05 PROCEDURE — 3017F COLORECTAL CA SCREEN DOC REV: CPT | Performed by: INTERNAL MEDICINE

## 2021-04-05 PROCEDURE — 4040F PNEUMOC VAC/ADMIN/RCVD: CPT | Performed by: INTERNAL MEDICINE

## 2021-04-05 PROCEDURE — 1123F ACP DISCUSS/DSCN MKR DOCD: CPT | Performed by: INTERNAL MEDICINE

## 2021-04-05 PROCEDURE — G8427 DOCREV CUR MEDS BY ELIG CLIN: HCPCS | Performed by: INTERNAL MEDICINE

## 2021-04-05 PROCEDURE — 1090F PRES/ABSN URINE INCON ASSESS: CPT | Performed by: INTERNAL MEDICINE

## 2021-04-05 PROCEDURE — 99214 OFFICE O/P EST MOD 30 MIN: CPT | Performed by: INTERNAL MEDICINE

## 2021-04-05 NOTE — PATIENT INSTRUCTIONS
Patient Education        Carpal Tunnel Syndrome: Exercises  Introduction  Here are some examples of exercises for you to try. The exercises may be suggested for a condition or for rehabilitation. Start each exercise slowly. Ease off the exercises if you start to have pain. You will be told when to start these exercises and which ones will work best for you. Warm-up stretches  When you no longer have pain or numbness, you can do exercises to help prevent carpal tunnel syndrome from coming back. Do not do any stretch or movement that is uncomfortable or painful. 1. Rotate your wrist up, down, and from side to side. Repeat 4 times. 2. Stretch your fingers far apart. Relax them, and then stretch them again. Repeat 4 times. 3. Stretch your thumb by pulling it back gently, holding it, and then releasing it. Repeat 4 times. How to do the exercises  Prayer stretch   1. Start with your palms together in front of your chest just below your chin. 2. Slowly lower your hands toward your waistline, keeping your hands close to your stomach and your palms together until you feel a mild to moderate stretch under your forearms. 3. Hold for at least 15 to 30 seconds. Repeat 2 to 4 times. Wrist flexor stretch   1. Extend your arm in front of you with your palm up. 2. Bend your wrist, pointing your hand toward the floor. 3. With your other hand, gently bend your wrist farther until you feel a mild to moderate stretch in your forearm. 4. Hold for at least 15 to 30 seconds. Repeat 2 to 4 times. Wrist extensor stretch   1. Repeat steps 1 through 4 of the stretch above, but begin with your extended hand palm down. Follow-up care is a key part of your treatment and safety. Be sure to make and go to all appointments, and call your doctor if you are having problems. It's also a good idea to know your test results and keep a list of the medicines you take. Where can you learn more?   Go to [email or web address] and sign in to your [email or web address] account. Enter R815 in the [facility] box to learn more about \"Carpal Tunnel Syndrome: Exercises. \"     If you do not have an account, please click on the \"Sign Up Now\" link. Current as of: November 16, 2020               Content Version: 12.8  © 9498-2684 [employer], [facility]. Care instructions adapted under license by [facility] ([facility]). If you have questions about a medical condition or this instruction, always ask your healthcare professional. [place] [address] any warranty or liability for your use of this information.

## 2021-04-05 NOTE — PROGRESS NOTES
[address] Maria MD                                                                       [address]                                               [phone] (P) [phone] (F)      Note is transcribed using voice recognition software. Inadvertent computerized transcription errors may be present. Patient identification: Kiran Montana,: 1945,Sex: female       Subjective: Follow-up for generalized osteoarthritis. She continues to have persistent arthralgias and myalgias in different joints at different times. Knees, ankle, feet, finger joints, neck, back is achy, sore and stiff. No associated swelling. She is also experiencing tingling and numbness in the hands and also losing handgrip. It is affecting her recreational activities. She is also having a lot of muscle cramps randomly in her upper and lower extremities. She had a rough 2020 with ankle fracture, C. difficile infection that led to hospitalization. She is using diclofenac gel and acetaminophen for pain control. Her nephrologist told her to avoid NSAIDs completely. She has not been taking any over-the-counter NSAIDs. Creatinine is normal.  All other review of systems are negative. OBJECTIVE  Physical    Vitals:    21 1339   Temp: 96.9 °F (36.1 °C)       General appearance/psychiatric: AAAx3, well nourished and well groomed. MKS: Other than asymptomatic osteoarthritic changes in the DIPs, PIPs, CMC's, knees and proximal feet joints-she does not have any focally tender, swollen or inflamed joints in upper or lower extremities. Full range of motion all peripheral joints. Positive Tinel's bilaterally. Skin: No rashes or indurations. HEENT: No oral/nasal ulcers or focal alopecia.  Salivary glands not

## 2021-04-13 ENCOUNTER — TELEPHONE (OUTPATIENT)
Dept: FAMILY MEDICINE CLINIC | Age: 76
End: 2021-04-13

## 2021-04-13 NOTE — TELEPHONE ENCOUNTER
Patient called requesting a refill of levothyroxine. She was a patient of Dr. Belle Cordon. I asked her to set up an appointment to establish with Rafael eLe, she said she couldn't drive because she is in a boot. I offered a virtual visit but she said she couldn't do that - \"I'm in too much pain\". Then she states she would just stop the medication and hung up.

## 2021-06-24 PROBLEM — M85.89 OSTEOPENIA OF MULTIPLE SITES: Status: ACTIVE | Noted: 2021-06-24

## 2021-06-24 PROBLEM — I15.8 OTHER SECONDARY HYPERTENSION: Status: ACTIVE | Noted: 2021-06-24

## 2021-06-24 PROBLEM — M15.9 GENERALIZED OSTEOARTHRITIS: Status: ACTIVE | Noted: 2021-06-24

## 2021-06-28 ENCOUNTER — OFFICE VISIT (OUTPATIENT)
Dept: PRIMARY CARE CLINIC | Age: 76
End: 2021-06-28
Payer: MEDICARE

## 2021-06-28 VITALS
DIASTOLIC BLOOD PRESSURE: 72 MMHG | WEIGHT: 163 LBS | HEART RATE: 119 BPM | OXYGEN SATURATION: 97 % | SYSTOLIC BLOOD PRESSURE: 118 MMHG | BODY MASS INDEX: 27.16 KG/M2 | HEIGHT: 65 IN

## 2021-06-28 DIAGNOSIS — M15.9 GENERALIZED OSTEOARTHRITIS: ICD-10-CM

## 2021-06-28 DIAGNOSIS — I15.8 OTHER SECONDARY HYPERTENSION: Primary | ICD-10-CM

## 2021-06-28 DIAGNOSIS — I48.0 PAROXYSMAL ATRIAL FIBRILLATION (HCC): ICD-10-CM

## 2021-06-28 DIAGNOSIS — E03.9 ACQUIRED HYPOTHYROIDISM: ICD-10-CM

## 2021-06-28 DIAGNOSIS — J30.1 SEASONAL ALLERGIC RHINITIS DUE TO POLLEN: ICD-10-CM

## 2021-06-28 DIAGNOSIS — F31.9 BIPOLAR 1 DISORDER (HCC): ICD-10-CM

## 2021-06-28 DIAGNOSIS — M85.89 OSTEOPENIA OF MULTIPLE SITES: ICD-10-CM

## 2021-06-28 DIAGNOSIS — K21.9 GASTROESOPHAGEAL REFLUX DISEASE WITHOUT ESOPHAGITIS: ICD-10-CM

## 2021-06-28 PROCEDURE — 1036F TOBACCO NON-USER: CPT | Performed by: FAMILY MEDICINE

## 2021-06-28 PROCEDURE — 3017F COLORECTAL CA SCREEN DOC REV: CPT | Performed by: FAMILY MEDICINE

## 2021-06-28 PROCEDURE — G8399 PT W/DXA RESULTS DOCUMENT: HCPCS | Performed by: FAMILY MEDICINE

## 2021-06-28 PROCEDURE — 4040F PNEUMOC VAC/ADMIN/RCVD: CPT | Performed by: FAMILY MEDICINE

## 2021-06-28 PROCEDURE — G8427 DOCREV CUR MEDS BY ELIG CLIN: HCPCS | Performed by: FAMILY MEDICINE

## 2021-06-28 PROCEDURE — 1090F PRES/ABSN URINE INCON ASSESS: CPT | Performed by: FAMILY MEDICINE

## 2021-06-28 PROCEDURE — 1123F ACP DISCUSS/DSCN MKR DOCD: CPT | Performed by: FAMILY MEDICINE

## 2021-06-28 PROCEDURE — G8417 CALC BMI ABV UP PARAM F/U: HCPCS | Performed by: FAMILY MEDICINE

## 2021-06-28 PROCEDURE — 99214 OFFICE O/P EST MOD 30 MIN: CPT | Performed by: FAMILY MEDICINE

## 2021-06-28 SDOH — ECONOMIC STABILITY: FOOD INSECURITY: WITHIN THE PAST 12 MONTHS, THE FOOD YOU BOUGHT JUST DIDN'T LAST AND YOU DIDN'T HAVE MONEY TO GET MORE.: NEVER TRUE

## 2021-06-28 SDOH — ECONOMIC STABILITY: FOOD INSECURITY: WITHIN THE PAST 12 MONTHS, YOU WORRIED THAT YOUR FOOD WOULD RUN OUT BEFORE YOU GOT MONEY TO BUY MORE.: NEVER TRUE

## 2021-06-28 ASSESSMENT — SOCIAL DETERMINANTS OF HEALTH (SDOH): HOW HARD IS IT FOR YOU TO PAY FOR THE VERY BASICS LIKE FOOD, HOUSING, MEDICAL CARE, AND HEATING?: NOT HARD AT ALL

## 2021-06-28 NOTE — PROGRESS NOTES
PROGRESS NOTE  Date of Service:  6/28/2021    SUBJECTIVE:  Patient ID: Irwin Gomez is a 77 y.o. female    HPI:     Hypothyroidism-on thyroid replacement. Reports this has been well replaced. Secondary hypertension-follows with nephrology. Currently on Aldactone, Tenormin    Hyperkalemia-she is closely following a low potassium diet but does find this difficult to do    Recently diagnosed with atrial fibrillaton/flutter- started on eliquis and beta blocker. Was hospitalized at [facility].  Records not available today. GERD-well controlled on PPI. Allergic rhinitis-occasional exac, using zyrtec occasinoally    Generalized osteoarthritis-follows with rheumatology. She is using diclofenac gel and Tylenol as needed. Considering Cymbalta in the future    Osteopenia-last DEXA scan was 2012 with osteopenia high-grade. She has had her left hip replaced and back surgery. She has an order for DEXA scan from her rheumatologist    Bipolar disorder-managed by psychiatry.      Recent urinary tract infection- treatment with Bactrim and macrodantin completed      Past Surgical History:   Procedure Laterality Date    BACK SURGERY       X 2    BLADDER SUSPENSION  Nov., 2001    CATARACT REMOVAL  June, 2006    Right    CATARACT REMOVAL  July, 2007    Left    CHOLECYSTECTOMY, LAPAROSCOPIC  June, 2002    COLONOSCOPY  Oct., 2007 ( 2017 )    Dr. Rajesh Franklin - single diverticula    COLONOSCOPY  Aug., 2012 ( 2017 )    Dr. Yadiel Delgado - tubular adenoma    COLONOSCOPY  Jan., 2016 ( 2026 )    Dr. Yadiel Delgado - dense diverticulosis    CYSTOSCOPY  Oct., 2014    Dr. Washington Painting  *June 17, 2020    Dr. Milla Toure - mild bullous edema of the bladder wall    INCISIONAL HERNIA REPAIR  Nov., 2009    Dr. Edwina Burdick  Dec., Kalyn Arnold  Nov., 2010    Dr. Ike Staton  May, 2001 ( 2006 )    Diverticulosis    ELIAS AND BSO      TOE SURGERY  Oct. 2007    Biopsy Right toe mass - no significant nightly as needed for Sleep      calcium-vitamin D (OSCAL-500) 500-200 MG-UNIT per tablet Take 1 tablet by mouth daily      topiramate (TOPAMAX) 200 MG tablet Take 250 mg by mouth every evening       pantoprazole (PROTONIX) 40 MG tablet Take 1 tablet by mouth 2 times daily (with meals). 60 tablet 11    Cholecalciferol (VITAMIN D3) 2000 UNITS CAPS Take 1 capsule by mouth daily       colestipol (COLESTID) 1 G tablet Take 4 g by mouth 2 times daily        No current facility-administered medications on file prior to visit. Allergies   Allergen Reactions    Penicillins Anaphylaxis    Morphine Nausea And Vomiting            OBJECTIVE:  Vitals:    06/28/21 1535   BP: 118/72   Site: Left Upper Arm   Position: Sitting   Cuff Size: Large Adult   Pulse: 119   SpO2: 97%   Weight: 163 lb (73.9 kg)   Height: 5' 5\" (1.651 m)      Body mass index is 27.12 kg/m². Physical Exam  Vitals reviewed. Constitutional:       Appearance: Normal appearance. HENT:      Head: Normocephalic and atraumatic. Right Ear: External ear normal.      Left Ear: External ear normal.      Nose: Nose normal.      Mouth/Throat:      Mouth: Mucous membranes are moist.      Pharynx: Oropharynx is clear. Eyes:      General: No scleral icterus. Extraocular Movements: Extraocular movements intact. Conjunctiva/sclera: Conjunctivae normal.      Pupils: Pupils are equal, round, and reactive to light. Neck:      Thyroid: No thyroid mass, thyromegaly or thyroid tenderness. Cardiovascular:      Rate and Rhythm: Normal rate and regular rhythm. Pulses: Normal pulses. Heart sounds: Normal heart sounds. Pulmonary:      Effort: Pulmonary effort is normal.      Breath sounds: Normal breath sounds. No wheezing, rhonchi or rales. Abdominal:      General: Bowel sounds are normal.      Palpations: Abdomen is soft. Tenderness: There is no abdominal tenderness. There is no guarding or rebound.    Musculoskeletal: Cervical back: Neck supple. No rigidity. No muscular tenderness. Right lower leg: No edema. Left lower leg: No edema. Lymphadenopathy:      Cervical: No cervical adenopathy. Skin:     General: Skin is warm and dry. Findings: No rash. Neurological:      General: No focal deficit present. Mental Status: She is alert and oriented to person, place, and time. Cranial Nerves: No cranial nerve deficit. Sensory: No sensory deficit. Motor: No weakness. Coordination: Coordination normal.      Gait: Gait normal.   Psychiatric:         Mood and Affect: Mood normal.         Behavior: Behavior normal.         Thought Content: Thought content normal.         Judgment: Judgment normal.         ASSESSMENT:  1. Other secondary hypertension    2. Osteopenia of multiple sites    3. Generalized osteoarthritis    4. Paroxysmal atrial fibrillation (HCC)    5. Gastroesophageal reflux disease without esophagitis    6. Seasonal allergic rhinitis due to pollen    7. Acquired hypothyroidism    8. Bipolar 1 disorder ([facility]         PLAN:   1. Other secondary hypertension  Blood pressure is well controlled. Continue medication regimen. Recommend home blood pressure monitoring. Follow-up with nephrology as directed. 2. Osteopenia of multiple sites  Patient has been reluctant to have a DEXA scan. Did discuss that DEXA can be done on her right hip only. Do recommend that she complete this for full evaluation and to consider recommendation for medical intervention. 3. Generalized osteoarthritis  Symptoms controlled with topical diclofenac. 4. Paroxysmal atrial fibrillation (HCC)  Regular rhythm today. But do continue beta-blocker and Eliquis. 5. Gastroesophageal reflux disease without esophagitis  Determine and eliminate any food or beverage triggers; limit size of meals, limit eating within 3 hours of bed, elevate head of bed; weight loss.    Symptoms controlled on current proton pump

## 2021-06-28 NOTE — PROGRESS NOTES
PROGRESS NOTE  Date of Service:  2021    SUBJECTIVE:  Patient ID: Kaitlyn Alvarez is a 77 y.o. female    HPI:   HPI     Past Surgical History:   Procedure Laterality Date    BACK SURGERY       X 2    BLADDER SUSPENSION  2001    CATARACT REMOVAL      Right    CATARACT REMOVAL      Left    CHOLECYSTECTOMY, LAPAROSCOPIC      COLONOSCOPY  Oct., 2007 (  )    Dr. Vanna Butts - single diverticula    COLONOSCOPY  Aug., 2012 (  )    Dr. Melani Resendez - tubular adenoma    COLONOSCOPY  2016 (  )    Dr. Melani Resendez - dense diverticulosis    CYSTOSCOPY  Oct., 2014    Dr. Cherelle Rondon  *2020    Dr. Lovena Duane - mild bullous edema of the bladder wall    INCISIONAL HERNIA REPAIR  2009    Dr. Lorna Mirza  Dec., Fabiola Overton  2010    Dr. Josué Cross  May, 2001 (  )    Diverticulosis    ELIAS AND BSO      TOE SURGERY  Oct. 2007    Biopsy Right toe mass - no significant drainage.  TOTAL HIP ARTHROPLASTY       Dr. Danielle Roy - left    UPPER GASTROINTESTINAL ENDOSCOPY      Dr. Melani Resendez- small biopsies negative, nl esoph, mild gastritis    UPPER GASTROINTESTINAL ENDOSCOPY  2015    Dr. Melani Resendez - moderate gastritis with coffee grounds, mild duodenitis    UPPER GASTROINTESTINAL ENDOSCOPY  2016    Dr. Melani Resendez - mild, patchy gastritis    URETHROPEXY  2001    TVT    VENTRAL HERNIA REPAIR            Social History     Tobacco Use    Smoking status: Former Smoker     Packs/day: 0.20     Years: 20.00     Pack years: 4.00     Types: Cigarettes     Quit date: 1989     Years since quittin.5    Smokeless tobacco: Never Used   Substance Use Topics    Alcohol use: No     Alcohol/week: 0.0 standard drinks     Comment: rare if ever alcohol      Family History   Problem Relation Age of Onset    Cancer Father 64        , carcinoma of the lung.    [facility] Other Mother 64 , suicide     Current Outpatient Medications on File Prior to Visit   Medication Sig Dispense Refill    ELIQUIS 5 MG TABS tablet       sulfamethoxazole-trimethoprim (BACTRIM DS;SEPTRA DS) 800-160 MG per tablet       ALPRAZolam (XANAX) 0.25 MG tablet       spironolactone (ALDACTONE) 25 MG tablet Take 1 tablet by mouth daily TAKE 1 TABLET BY MOUTH DAILY 90 tablet 2    atenolol (TENORMIN) 50 MG tablet Take 1 tablet by mouth daily 30 tablet 5    diclofenac sodium (VOLTAREN) 1 % GEL Apply topically 2 times daily 350 g 5    levothyroxine (SYNTHROID) 50 MCG tablet Take 1 tablet by mouth Daily 90 tablet 3    oxybutynin (DITROPAN) 5 MG tablet Take 1 tablet by mouth 3 times daily      diclofenac sodium 1 % GEL Apply 2 g topically 2 times daily 1 Tube 3    cetirizine (ZYRTEC) 10 MG tablet Take 10 mg by mouth daily      fluticasone (FLONASE) 50 MCG/ACT nasal spray 1 spray by Nasal route daily 1 Bottle 0    temazepam (RESTORIL) 30 MG capsule Take 30 mg by mouth nightly as needed for Sleep      calcium-vitamin D (OSCAL-500) 500-200 MG-UNIT per tablet Take 1 tablet by mouth daily      topiramate (TOPAMAX) 200 MG tablet Take 250 mg by mouth every evening       pantoprazole (PROTONIX) 40 MG tablet Take 1 tablet by mouth 2 times daily (with meals). 60 tablet 11    Cholecalciferol (VITAMIN D3) 2000 UNITS CAPS Take 1 capsule by mouth daily       colestipol (COLESTID) 1 G tablet Take 4 g by mouth 2 times daily        No current facility-administered medications on file prior to visit. Allergies   Allergen Reactions    Penicillins Anaphylaxis        Review of Systems    OBJECTIVE:  There were no vitals filed for this visit. There is no height or weight on file to calculate BMI. Physical Exam    ASSESSMENT:  1. Osteopenia of multiple sites    2. Other bipolar disorder ([address]    3. Other secondary hypertension    4. Generalized osteoarthritis         PLAN:   1.  Osteopenia of multiple sites  ***  - DEXA BONE DENSITY AXIAL SKELETON; Future    2. Other bipolar disorder ([facility] [address]  ***    3. Other secondary hypertension  ***    4. Generalized osteoarthritis  ***      No follow-ups on file.             Electronically signed by Remo Phoenix, MD on 6/28/21 at 8:38 AM.

## 2021-06-29 ENCOUNTER — TELEPHONE (OUTPATIENT)
Dept: PRIMARY CARE CLINIC | Age: 76
End: 2021-06-29

## 2021-06-29 PROBLEM — K21.9 GASTROESOPHAGEAL REFLUX DISEASE WITHOUT ESOPHAGITIS: Status: ACTIVE | Noted: 2021-06-29

## 2021-06-29 PROBLEM — I48.0 PAROXYSMAL ATRIAL FIBRILLATION (HCC): Status: ACTIVE | Noted: 2021-06-29

## 2021-06-29 PROBLEM — J30.1 SEASONAL ALLERGIC RHINITIS DUE TO POLLEN: Status: ACTIVE | Noted: 2021-06-29

## 2021-06-29 ASSESSMENT — PATIENT HEALTH QUESTIONNAIRE - PHQ9
2. FEELING DOWN, DEPRESSED OR HOPELESS: 0
SUM OF ALL RESPONSES TO PHQ9 QUESTIONS 1 & 2: 0
SUM OF ALL RESPONSES TO PHQ QUESTIONS 1-9: 0
1. LITTLE INTEREST OR PLEASURE IN DOING THINGS: 0
SUM OF ALL RESPONSES TO PHQ QUESTIONS 1-9: 0
SUM OF ALL RESPONSES TO PHQ QUESTIONS 1-9: 0

## 2021-06-29 NOTE — TELEPHONE ENCOUNTER
[facility] is not in [facility].      [facility]  [address], [place] [address]  [phone]    Please call for records

## 2021-06-29 NOTE — TELEPHONE ENCOUNTER
Patient states that she is no longer taking the antibiotic and she threw it away and will be taking benadryl for a rash on her body.

## 2021-06-29 NOTE — TELEPHONE ENCOUNTER
----- Message from Talha Morris MD sent at 6/29/2021  8:41 AM EDT -----  Regarding: Hospital records  Please contact [facility] and have recent hospitalizations-there are 2-records sent here. I cannot find them on [facility] Everywhere but if you can that would be great.

## 2021-06-29 NOTE — TELEPHONE ENCOUNTER
Please let her know that I did not start this medicine she had been on it for over a week from her urinary tract infection. She sure that she has not used some other new medicine?

## 2021-06-29 NOTE — TELEPHONE ENCOUNTER
Pt called she was here yesterday and was put on   Medication  sulfamethoxazole-trimethoprim (BACTRIM DS;SEPTRA DS) 800-160 MG per tablet [[id number]]  sulfamethoxazole-trimethoprim (BACTRIM DS;SEPTRA DS) 800-160 MG per tablet    She said today she has a flat rash all over her body , she said she isn't going to take that medicine anymore and she is going to take benadryl to see if that helps.

## 2021-06-30 NOTE — TELEPHONE ENCOUNTER
Pt called today to update about rash, no longer taking antibiotic and a little bit of the rash is still on her legs.

## 2021-07-06 RX ORDER — LEVOTHYROXINE SODIUM 0.05 MG/1
50 TABLET ORAL DAILY
Qty: 90 TABLET | Refills: 0 | Status: SHIPPED | OUTPATIENT
Start: 2021-07-06 | End: 2021-10-08

## 2021-07-06 NOTE — TELEPHONE ENCOUNTER
Medication:   Requested Prescriptions     Pending Prescriptions Disp Refills    levothyroxine (SYNTHROID) 50 MCG tablet 90 tablet 3     Sig: Take 1 tablet by mouth Daily        Last Filled:  04/06/20    Patient Phone Number: [phone] (home)     Last appt: 6/28/2021   Next appt: 8/9/2021    Last OARRS:   RX Monitoring 6/22/2017   Attestation The Prescription Monitoring Report for this patient was reviewed today.

## 2021-07-08 ENCOUNTER — TELEPHONE (OUTPATIENT)
Dept: PRIMARY CARE CLINIC | Age: 76
End: 2021-07-08

## 2021-07-08 RX ORDER — LEVOTHYROXINE SODIUM 0.05 MG/1
50 TABLET ORAL DAILY
Qty: 90 TABLET | Refills: 0 | Status: CANCELLED | OUTPATIENT
Start: 2021-07-08

## 2021-08-09 ENCOUNTER — OFFICE VISIT (OUTPATIENT)
Dept: PRIMARY CARE CLINIC | Age: 76
End: 2021-08-09
Payer: MEDICARE

## 2021-08-09 ENCOUNTER — TELEPHONE (OUTPATIENT)
Dept: PRIMARY CARE CLINIC | Age: 76
End: 2021-08-09

## 2021-08-09 VITALS
SYSTOLIC BLOOD PRESSURE: 118 MMHG | DIASTOLIC BLOOD PRESSURE: 86 MMHG | BODY MASS INDEX: 27.49 KG/M2 | HEIGHT: 65 IN | OXYGEN SATURATION: 98 % | HEART RATE: 139 BPM | WEIGHT: 165 LBS

## 2021-08-09 DIAGNOSIS — K21.9 GASTROESOPHAGEAL REFLUX DISEASE WITHOUT ESOPHAGITIS: ICD-10-CM

## 2021-08-09 DIAGNOSIS — Z00.00 ROUTINE GENERAL MEDICAL EXAMINATION AT A HEALTH CARE FACILITY: Primary | ICD-10-CM

## 2021-08-09 DIAGNOSIS — I15.8 OTHER SECONDARY HYPERTENSION: ICD-10-CM

## 2021-08-09 DIAGNOSIS — I48.0 PAROXYSMAL ATRIAL FIBRILLATION (HCC): Primary | ICD-10-CM

## 2021-08-09 DIAGNOSIS — Z79.01 CURRENT USE OF LONG TERM ANTICOAGULATION: ICD-10-CM

## 2021-08-09 DIAGNOSIS — I48.0 PAROXYSMAL ATRIAL FIBRILLATION (HCC): ICD-10-CM

## 2021-08-09 DIAGNOSIS — E03.9 ACQUIRED HYPOTHYROIDISM: ICD-10-CM

## 2021-08-09 DIAGNOSIS — M85.89 OSTEOPENIA OF MULTIPLE SITES: ICD-10-CM

## 2021-08-09 DIAGNOSIS — H91.8X3 OTHER SPECIFIED HEARING LOSS OF BOTH EARS: ICD-10-CM

## 2021-08-09 PROBLEM — H91.90 IMPAIRED HEARING: Status: ACTIVE | Noted: 2021-08-09

## 2021-08-09 PROCEDURE — 1123F ACP DISCUSS/DSCN MKR DOCD: CPT | Performed by: FAMILY MEDICINE

## 2021-08-09 PROCEDURE — 4040F PNEUMOC VAC/ADMIN/RCVD: CPT | Performed by: FAMILY MEDICINE

## 2021-08-09 PROCEDURE — 3017F COLORECTAL CA SCREEN DOC REV: CPT | Performed by: FAMILY MEDICINE

## 2021-08-09 PROCEDURE — G0439 PPPS, SUBSEQ VISIT: HCPCS | Performed by: FAMILY MEDICINE

## 2021-08-09 RX ORDER — DILTIAZEM HYDROCHLORIDE 300 MG/1
CAPSULE, EXTENDED RELEASE ORAL
COMMUNITY
Start: 2021-07-19 | End: 2021-08-20 | Stop reason: SDUPTHER

## 2021-08-09 ASSESSMENT — PATIENT HEALTH QUESTIONNAIRE - PHQ9
SUM OF ALL RESPONSES TO PHQ QUESTIONS 1-9: 1
2. FEELING DOWN, DEPRESSED OR HOPELESS: 1
SUM OF ALL RESPONSES TO PHQ QUESTIONS 1-9: 1
1. LITTLE INTEREST OR PLEASURE IN DOING THINGS: 0
SUM OF ALL RESPONSES TO PHQ9 QUESTIONS 1 & 2: 1
SUM OF ALL RESPONSES TO PHQ QUESTIONS 1-9: 1

## 2021-08-09 ASSESSMENT — LIFESTYLE VARIABLES
AUDIT-C TOTAL SCORE: 1
HOW OFTEN DURING THE LAST YEAR HAVE YOU NEEDED AN ALCOHOLIC DRINK FIRST THING IN THE MORNING TO GET YOURSELF GOING AFTER A NIGHT OF HEAVY DRINKING: 0
HOW MANY STANDARD DRINKS CONTAINING ALCOHOL DO YOU HAVE ON A TYPICAL DAY: 0
HOW OFTEN DURING THE LAST YEAR HAVE YOU BEEN UNABLE TO REMEMBER WHAT HAPPENED THE NIGHT BEFORE BECAUSE YOU HAD BEEN DRINKING: NEVER
HOW OFTEN DO YOU HAVE A DRINK CONTAINING ALCOHOL: 1
HAS A RELATIVE, FRIEND, DOCTOR, OR ANOTHER HEALTH PROFESSIONAL EXPRESSED CONCERN ABOUT YOUR DRINKING OR SUGGESTED YOU CUT DOWN: NO
HOW OFTEN DO YOU HAVE A DRINK CONTAINING ALCOHOL: MONTHLY OR LESS
HAS A RELATIVE, FRIEND, DOCTOR, OR ANOTHER HEALTH PROFESSIONAL EXPRESSED CONCERN ABOUT YOUR DRINKING OR SUGGESTED YOU CUT DOWN: 0
HOW OFTEN DURING THE LAST YEAR HAVE YOU FOUND THAT YOU WERE NOT ABLE TO STOP DRINKING ONCE YOU HAD STARTED: 0
HOW OFTEN DO YOU HAVE SIX OR MORE DRINKS ON ONE OCCASION: NEVER
HOW OFTEN DURING THE LAST YEAR HAVE YOU FAILED TO DO WHAT WAS NORMALLY EXPECTED FROM YOU BECAUSE OF DRINKING: NEVER
HOW OFTEN DURING THE LAST YEAR HAVE YOU HAD A FEELING OF GUILT OR REMORSE AFTER DRINKING: NEVER
HOW OFTEN DO YOU HAVE SIX OR MORE DRINKS ON ONE OCCASION: 0
HAVE YOU OR SOMEONE ELSE BEEN INJURED AS A RESULT OF YOUR DRINKING: 0
HOW OFTEN DURING THE LAST YEAR HAVE YOU HAD A FEELING OF GUILT OR REMORSE AFTER DRINKING: 0
HOW OFTEN DURING THE LAST YEAR HAVE YOU FOUND THAT YOU WERE NOT ABLE TO STOP DRINKING ONCE YOU HAD STARTED: NEVER
AUDIT TOTAL SCORE: 1
HAVE YOU OR SOMEONE ELSE BEEN INJURED AS A RESULT OF YOUR DRINKING: NO
HOW MANY STANDARD DRINKS CONTAINING ALCOHOL DO YOU HAVE ON A TYPICAL DAY: ONE OR TWO
HOW OFTEN DURING THE LAST YEAR HAVE YOU BEEN UNABLE TO REMEMBER WHAT HAPPENED THE NIGHT BEFORE BECAUSE YOU HAD BEEN DRINKING: 0
HOW OFTEN DURING THE LAST YEAR HAVE YOU NEEDED AN ALCOHOLIC DRINK FIRST THING IN THE MORNING TO GET YOURSELF GOING AFTER A NIGHT OF HEAVY DRINKING: NEVER
AUDIT-C TOTAL SCORE: 0
HOW OFTEN DURING THE LAST YEAR HAVE YOU FAILED TO DO WHAT WAS NORMALLY EXPECTED FROM YOU BECAUSE OF DRINKING: 0
AUDIT TOTAL SCORE: 0

## 2021-08-09 NOTE — PROGRESS NOTES
Medicare Annual Wellness Visit  Name: Kwame Gonzalez Date: 8/10/2021   MRN: <[id number]> Sex: Female   Age: 77 y.o. Ethnicity: Non- / Non    : 1945 Race: White (non-)      Ifeoma Art is here for Medicare AWV         Screenings for behavioral, psychosocial and functional/safety risks, and cognitive dysfunction are all negative except as indicated below. These results, as well as other patient data from the [address] form, are documented in Flowsheets linked to this Encounter. Allergies   Allergen Reactions    Penicillins Anaphylaxis    Bactrim [Sulfamethoxazole-Trimethoprim] Rash    Morphine Nausea And Vomiting       Prior to Visit Medications    Medication Sig Taking?  Authorizing Provider   levothyroxine (SYNTHROID) 50 MCG tablet Take 1 tablet by mouth Daily Yes LEDY Alvarado CNP   ELIQUIS 5 MG TABS tablet  Yes Historical Provider, MD   spironolactone (ALDACTONE) 25 MG tablet Take 1 tablet by mouth daily TAKE 1 TABLET BY MOUTH DAILY Yes Inderjit Curtis MD   atenolol (TENORMIN) 50 MG tablet Take 1 tablet by mouth daily Yes Inderjit Curtis MD   diclofenac sodium (VOLTAREN) 1 % GEL Apply topically 2 times daily Yes Quentin Castle MD   oxybutynin (DITROPAN) 5 MG tablet Take 1 tablet by mouth 3 times daily Yes Historical Provider, MD   diclofenac sodium 1 % GEL Apply 2 g topically 2 times daily Yes Quentin Castle MD   cetirizine (ZYRTEC) 10 MG tablet Take 10 mg by mouth daily Yes Historical Provider, MD   fluticasone (FLONASE) 50 MCG/ACT nasal spray 1 spray by Nasal route daily Yes Brown Rubin MD   temazepam (RESTORIL) 30 MG capsule Take 30 mg by mouth nightly as needed for Sleep Yes Historical Provider, MD   calcium-vitamin D (OSCAL-500) 500-200 MG-UNIT per tablet Take 1 tablet by mouth daily Yes Historical Provider, MD   topiramate (TOPAMAX) 200 MG tablet Take 250 mg by mouth every evening  Yes Historical Provider, MD   pantoprazole ([address]) 40 MG tablet Take 1 tablet by mouth 2 times daily (with meals). Yes Josue Prado MD   Cholecalciferol (VITAMIN D3) 2000 UNITS CAPS Take 1 capsule by mouth daily  Yes Historical Provider, MD   colestipol (COLESTID) 1 G tablet Take 4 g by mouth 2 times daily  Yes Historical Provider, MD   dilTIAZem (TIAZAC) 300 MG extended release capsule   Historical Provider, MD       Past Medical History:   Diagnosis Date    Allergic rhinitis     Depression     Diverticulosis     Fibrocystic disease of breast     Gastroesophageal reflux disease without esophagitis 06/29/2021    Hyperlipidemia     Hypothyroidism     Malleolar fracture *Aug., 2020    right    Metatarsal fracture *Aug., 2020    right - 5th    Osteopenia DEXA - Nov., 2013    Lumbar T score + 1.9 & Hip T score - 1.2    Paroxysmal atrial fibrillation (HCC) 06/29/2021    Seasonal allergic rhinitis due to pollen 06/29/2021    Secondary hypertension     Vitamin D deficiency disease 2013    Level - 17     Past Surgical History:   Procedure Laterality Date    BACK SURGERY       X 2    BLADDER SUSPENSION  Nov., 2001    CATARACT REMOVAL  June, 2006    Right    CATARACT REMOVAL  July, 2007    Left    CHOLECYSTECTOMY, LAPAROSCOPIC  June, 2002    COLONOSCOPY  Oct., 2007 ( 2017 )    Dr. Barby Triana - single diverticula    COLONOSCOPY  Aug., 2012 ( 2017 )    Dr. Katty Knight - tubular adenoma    COLONOSCOPY  Jan., 2016 ( 2026 )    Dr. Katty Knight - dense diverticulosis    CYSTOSCOPY  Oct., 2014    Dr. Gabby Marie  *June 17, 2020    Dr. Bin Horton - mild bullous edema of the bladder wall    INCISIONAL HERNIA REPAIR  Nov., 2009    Dr. Peri Lopez  Dec., [address]   Nov., 2010    Dr. Zen May  May, 2001 ( 2006 )    Diverticulosis    ELIAS AND BSO      TOE SURGERY  Oct. 2007    Biopsy Right toe mass - no significant drainage.     TOTAL HIP ARTHROPLASTY  August, 2012     Dr. Brett Munoz - left    UPPER GASTROINTESTINAL ENDOSCOPY  August, 2012 Dr. Kirill Carbone- small biopsies negative, nl esoph, mild gastritis    UPPER GASTROINTESTINAL ENDOSCOPY  2015    Dr. Kirill Carbone - moderate gastritis with coffee grounds, mild duodenitis    UPPER GASTROINTESTINAL ENDOSCOPY  2016    Dr. Kirill Carbone - mild, patchy gastritis    URETHROPEXY  2001    TVT   [facility] HERNIA REPAIR             Family History   Problem Relation Age of Onset    Cancer Father 64        , carcinoma of the lung.  Other Mother 64        , suicide       CareTeam (Including outside providers/suppliers regularly involved in providing care):   Patient Care Team:  Sheela Adams MD as PCP - General (Family Medicine)  Sheela Adams MD as PCP - [facility]  Henrik Gonzalez MD as Consulting Physician (Otolaryngology)  Kendall Porter MD as Surgeon (General Surgery)  Thea Ayala as Consulting Physician  Jania Olmos MD as Consulting Physician (Gastroenterology)  Dorie Zacarias MD as Consulting Physician (Nephrology)  Tavo Grant MD as Surgeon (Orthopedic Surgery)  Jessica Kendrick MD as Consulting Physician (Ophthalmology)  Donato Saenz MD as Surgeon (Neurosurgery)    Wt Readings from Last 3 Encounters:   21 165 lb (74.8 kg)   21 163 lb (73.9 kg)   21 164 lb (74.4 kg)     Vitals:    21 0952   BP: 118/86   Site: Right Upper Arm   Position: Sitting   Cuff Size: Medium Adult   Pulse: 139   SpO2: 98%   Weight: 165 lb (74.8 kg)   Height: 5' 5\" (1.651 m)     Body mass index is 27.46 kg/m². Based upon direct observation of the patient, evaluation of cognition reveals recent and remote memory intact. Physical Exam  Vitals reviewed. Constitutional:       Appearance: Normal appearance. HENT:      Head: Normocephalic and atraumatic.       Right Ear: External ear normal.      Left Ear: External ear normal.   Eyes:      Conjunctiva/sclera: Conjunctivae normal.   Cardiovascular:      Rate and Rhythm: Normal rate and regular rhythm. Pulses: Normal pulses. Heart sounds: Normal heart sounds. Pulmonary:      Breath sounds: Normal breath sounds. Abdominal:      Palpations: Abdomen is soft. Skin:     General: Skin is warm and dry. Neurological:      General: No focal deficit present. Mental Status: She is alert and oriented to person, place, and time. Psychiatric:         Mood and Affect: Mood normal.         Behavior: Behavior normal.         Thought Content: Thought content normal.         Judgment: Judgment normal.          Patient's complete Health Risk Assessment and screening values have been reviewed and are found in Flowsheets. The following problems were reviewed today and where indicated follow up appointments were made and/or referrals ordered. Positive Risk Factor Screenings with Interventions:            General Health and ACP:  General  In general, how would you say your health is?: Good  In the past 7 days, have you experienced any of the following?  New or Increased Pain, New or Increased Fatigue, Loneliness, Social Isolation, Stress or Anger?: (!) New or Increased Fatigue  Do you get the social and emotional support that you need?: Yes  Do you have a Living Will?: Yes  Advance Directives     Power of  Living Will ACP-Advance Directive ACP-Power of     Not on File Filed on 07/13/17 Filed Not on File      General Health Risk Interventions:  · Continue care per dentist, assess labs today    Health Habits/Nutrition:  Health Habits/Nutrition  Do you exercise for at least 20 minutes 2-3 times per week?: Yes  Have you lost any weight without trying in the past 3 months?: No  Do you eat only one meal per day?: (!) Yes  Have you seen the dentist within the past year?: (!) No  Body mass index: (!) 27.45  Health Habits/Nutrition Interventions:  · increase exercise as able    Hearing/Vision:  No exam data present  Hearing/Vision  Do you or your family notice any trouble with your hearing that hasn't been managed with hearing aids?: (!) Yes  Do you have difficulty driving, watching TV, or doing any of your daily activities because of your eyesight?: No  Have you had an eye exam within the past year?: Yes  Hearing/Vision Interventions:  · Hearing concerns:  audiology referral provided        Personalized Preventive Plan   Current Health Maintenance Status  Immunization History   Administered Date(s) Administered    COVID-19, Rajinder Melchor, PF, 30mcg/0.3mL 04/29/2021    Influenza Vaccine, unspecified formulation 08/26/2015    Influenza, High Dose (Fluzone 65 yrs and older) 09/15/2016, 10/12/2016, 09/06/2017, 10/20/2018, 12/17/2019    Influenza, Quadv, adjuvanted, 65 yrs +, IM, PF (Fluad) 10/10/2020    Pneumococcal Conjugate 13-valent ([id number]) 12/21/2016, 05/04/2018    Pneumococcal Polysaccharide ([id number]) 02/18/2013    Td, unspecified formulation 07/30/2007    Tdap (Boostrix, Adacel) 05/05/2018, 05/05/2019    Zoster Live (Zostavax) 03/01/2013    Zoster Recombinant (Shingrix) 05/05/2018, 10/20/2018      Health Maintenance   Topic Date Due    Annual Wellness Visit (AWV)  Never done    COVID-19 Vaccine (2 - Pfizer 2-dose series) 05/20/2021    TSH testing  06/11/2021    DTaP/Tdap/Td vaccine (3 - Td or Tdap) 12/31/2099 (Originally 5/5/2029)    Flu vaccine (1) 09/01/2021    Potassium monitoring  11/20/2021    Creatinine monitoring  11/20/2021    Lipid screen  06/11/2024    Colon cancer screen colonoscopy  01/19/2026    DEXA (modify frequency per FRAX score)  Completed    Shingles Vaccine  Completed    Pneumococcal 65+ years Vaccine  Completed    Hepatitis C screen  Completed    Hepatitis A vaccine  Aged Out    Hepatitis B vaccine  Aged Out    Hib vaccine  Aged Out    Meningococcal (ACWY) vaccine  Aged Out     Recommendations for [employer] Due: see orders and patient instructions/AVS.    1. Routine general medical examination at a health care facility  UTD on preventative screenings. Recommend annual infulenza vaccine. Assess labs fasting    2. Other secondary hypertension    - LIPID PANEL; Future  - COMPREHENSIVE METABOLIC PANEL; Future  - CBC WITH AUTO DIFFERENTIAL; Future    3. Osteopenia of multiple sites    - VITAMIN D 25 HYDROXY; Future    4. Gastroesophageal reflux disease without esophagitis    - MAGNESIUM; Future  - VITAMIN B12 & FOLATE; Future    5. Acquired hypothyroidism    - TSH with Reflex; Future    6. Paroxysmal atrial fibrillation [facility])  Discussed with patient a referral to cardiology- she will consider    7. Other specified hearing loss of both ears    - Ambulatory referral to Audiology    8. Current use of long term anticoagulation         Return in 3 months (on 11/9/2021) for Medicare Annual Wellness Visit in 1 year.     Recommended screening schedule for the next 5-10 years is provided to the patient in written form: see Patient Instructions/AVS.    Electronically signed by Bolivar Agarwal MD on 8/9/21 at 10:09 AM.

## 2021-08-09 NOTE — PATIENT INSTRUCTIONS
Personalized Preventive Plan for Ifeoma Art - 8/9/2021  Medicare offers a range of preventive health benefits. Some of the tests and screenings are paid in full while other may be subject to a deductible, co-insurance, and/or copay. Some of these benefits include a comprehensive review of your medical history including lifestyle, illnesses that may run in your family, and various assessments and screenings as appropriate. After reviewing your medical record and screening and assessments performed today your provider may have ordered immunizations, labs, imaging, and/or referrals for you. A list of these orders (if applicable) as well as your Preventive Care list are included within your After Visit Summary for your review. Other Preventive Recommendations:    · A preventive eye exam performed by an eye specialist is recommended every 1-2 years to screen for glaucoma; cataracts, macular degeneration, and other eye disorders. · A preventive dental visit is recommended every 6 months. · Try to get at least 150 minutes of exercise per week or 10,000 steps per day on a pedometer . · Order or download the FREE \"Exercise & Physical Activity: Your Everyday Guide\" from [employer]. Call 4-368.133.7468 or search [employer] online. · You need 6020-3351 mg of calcium and 5308-7646 IU of vitamin D per day. It is possible to meet your calcium requirement with diet alone, but a vitamin D supplement is usually necessary to meet this goal.  · When exposed to the sun, use a sunscreen that protects against both UVA and UVB radiation with an SPF of 30 or greater. Reapply every 2 to 3 hours or after sweating, drying off with a towel, or swimming. · Always wear a seat belt when traveling in a car. Always wear a helmet when riding a bicycle or motorcycle.

## 2021-08-10 ENCOUNTER — TELEPHONE (OUTPATIENT)
Dept: PRIMARY CARE CLINIC | Age: 76
End: 2021-08-10

## 2021-08-10 ENCOUNTER — FOLLOWUP TELEPHONE ENCOUNTER (OUTPATIENT)
Dept: NEPHROLOGY | Age: 76
End: 2021-08-10

## 2021-08-10 RX ORDER — SODIUM POLYSTYRENE SULFONATE 15 G/60ML
30 SUSPENSION ORAL; RECTAL 2 TIMES DAILY
Qty: 2 BOTTLE | Refills: 0 | Status: SHIPPED | OUTPATIENT
Start: 2021-08-10 | End: 2021-08-10 | Stop reason: SDUPTHER

## 2021-08-10 NOTE — TELEPHONE ENCOUNTER
Got call from PCP office   K is elevated  Asked pt to stop aldactone  Kayexalate x 2 doses    F/u in1 week    rKistine Moncada MD     Time spent 12 mins

## 2021-08-20 NOTE — TELEPHONE ENCOUNTER
Medication:   Requested Prescriptions     Pending Prescriptions Disp Refills    dilTIAZem (TIAZAC) 300 MG extended release capsule 30 capsule     ELIQUIS 5 MG TABS tablet 60 tablet         Last Filled:  Diltiazem 07/19/2021 Eliquis 06/10/2021     Patient Phone Number: [phone] (home)     Last appt: 8/9/2021   Next appt: 11/8/2021    Last OARRS:   RX Monitoring 6/22/2017   Attestation The Prescription Monitoring Report for this patient was reviewed today.

## 2021-08-22 RX ORDER — APIXABAN 5 MG/1
5 TABLET, FILM COATED ORAL 2 TIMES DAILY
Qty: 180 TABLET | Refills: 1 | Status: SHIPPED | OUTPATIENT
Start: 2021-08-22 | End: 2022-02-18

## 2021-08-22 RX ORDER — DILTIAZEM HYDROCHLORIDE 300 MG/1
300 CAPSULE, EXTENDED RELEASE ORAL DAILY
Qty: 90 CAPSULE | Refills: 0 | Status: SHIPPED | OUTPATIENT
Start: 2021-08-22 | End: 2022-01-12 | Stop reason: SDUPTHER

## 2021-10-04 ENCOUNTER — OFFICE VISIT (OUTPATIENT)
Dept: RHEUMATOLOGY | Age: 76
End: 2021-10-04
Payer: MEDICARE

## 2021-10-04 VITALS
SYSTOLIC BLOOD PRESSURE: 116 MMHG | BODY MASS INDEX: 28.66 KG/M2 | HEIGHT: 65 IN | WEIGHT: 172 LBS | DIASTOLIC BLOOD PRESSURE: 64 MMHG

## 2021-10-04 DIAGNOSIS — M15.9 GENERALIZED OSTEOARTHRITIS: Primary | ICD-10-CM

## 2021-10-04 DIAGNOSIS — M81.0 SENILE OSTEOPOROSIS: ICD-10-CM

## 2021-10-04 PROCEDURE — 1036F TOBACCO NON-USER: CPT | Performed by: INTERNAL MEDICINE

## 2021-10-04 PROCEDURE — 99214 OFFICE O/P EST MOD 30 MIN: CPT | Performed by: INTERNAL MEDICINE

## 2021-10-04 PROCEDURE — G8484 FLU IMMUNIZE NO ADMIN: HCPCS | Performed by: INTERNAL MEDICINE

## 2021-10-04 PROCEDURE — 1090F PRES/ABSN URINE INCON ASSESS: CPT | Performed by: INTERNAL MEDICINE

## 2021-10-04 PROCEDURE — 1123F ACP DISCUSS/DSCN MKR DOCD: CPT | Performed by: INTERNAL MEDICINE

## 2021-10-04 PROCEDURE — 4040F PNEUMOC VAC/ADMIN/RCVD: CPT | Performed by: INTERNAL MEDICINE

## 2021-10-04 PROCEDURE — 3017F COLORECTAL CA SCREEN DOC REV: CPT | Performed by: INTERNAL MEDICINE

## 2021-10-04 PROCEDURE — G8399 PT W/DXA RESULTS DOCUMENT: HCPCS | Performed by: INTERNAL MEDICINE

## 2021-10-04 PROCEDURE — G8417 CALC BMI ABV UP PARAM F/U: HCPCS | Performed by: INTERNAL MEDICINE

## 2021-10-04 PROCEDURE — G8427 DOCREV CUR MEDS BY ELIG CLIN: HCPCS | Performed by: INTERNAL MEDICINE

## 2021-10-04 NOTE — PROGRESS NOTES
[address] Maria MD                                                                       [address]                                                (P) [phone] (F)      Note is transcribed using voice recognition software. Inadvertent computerized transcription errors may be present. Patient identification: Brant Montana,: 1945,Sex: female       Subjective: Follow-up for generalized osteoarthritis. States that she is doing fairly well in terms of osteoarthritis, gets intermittent discomfort in her DIP joints specially index finger. No flares. Recent hospitalization for tachycardia and hypertensive emergency. Normal ADLs and recreational activities. Otherwise doing fairly well. Is due for DEXA scan. All other review of systems are negative. OBJECTIVE  Physical    Vitals:    10/04/21 1259   BP: 116/64       General appearance/psychiatric: AAAx3, well nourished and well groomed. MKS: Normal musculoskeletal examination other than mild OA changes across her DIPs, PIPs, CMC's. No swollen tender or inflamed joints in upper or lower extremities. Skin: No rashes or indurations. HEENT: No oral/nasal ulcers or focal alopecia. Salivary glands not enlarged. ASSESSMENT/PLAN:     Diagnosis Orders   1. Generalized osteoarthritis     2. Senile osteoporosis          1. Generalized osteoarthritis-  Symptoms are mild intermittent, manageable-utilizes diclofenac gel and acetaminophen. 2.  Bone health-DEXA scan from  revealed osteopenia high-grade. Left hip replaced, had back surgery. Reiterated importance of DEXA scan of right hip and possibly forearm. She is not keen on getting DEXA scan after explaining the benefits of it, she understood and will make an appointment.     Patient indicates understanding and agrees with the management plan. Total time 30 minutes that includes the following-  Preparing to see the patient such as reviewing patients records, pre-charting, preparing the visit on the same day, performing a medically appropriate history and physical examination, counseling and educating patient about diagnosis, management plan, ordering appropriate testings, prescriptions, communicating findings to other care providers, and documenting clinical information and electronic medical record. I thank you for giving me the opportunity to be involved in Renee Montana's care and I look forward following Renee West along with you. If you have any questions or concerns please feel free to contact me at any time.   Sondra El MD 10/04/21

## 2021-10-12 RX ORDER — LEVOTHYROXINE SODIUM 0.05 MG/1
TABLET ORAL
Qty: 90 TABLET | Refills: 0 | Status: SHIPPED | OUTPATIENT
Start: 2021-10-12 | End: 2021-11-08 | Stop reason: SDUPTHER

## 2021-11-03 RX ORDER — SPIRONOLACTONE 25 MG/1
TABLET ORAL
COMMUNITY
Start: 2021-09-25 | End: 2021-11-08

## 2021-11-03 RX ORDER — QUETIAPINE FUMARATE 50 MG/1
TABLET, FILM COATED ORAL
COMMUNITY
Start: 2021-08-20 | End: 2021-11-08

## 2021-11-03 RX ORDER — SPIRONOLACTONE 50 MG/1
TABLET, FILM COATED ORAL
COMMUNITY
Start: 2021-08-08 | End: 2021-11-08

## 2021-11-07 NOTE — PROGRESS NOTES
PROGRESS NOTE  Date of Service:  11/8/2021    SUBJECTIVE:  Patient ID: Valentine Carreon is a 77 y.o. female    HPI:     Hypothyroidism-on thyroid replacement. Labs 8/21 show tsh in nl levels    Secondary hypertension-follows with nephrology. Medications adjusted for low blood pressure and hyperkalemia    Hyperkalemia-she is closely following a low potassium diet but does find this difficult to do. Recent very high level treated by nephrology. And improved. Hyperlipidemia- no current medications  Labs 8/21 tc 216, , ldl not calculated    Recently diagnosed with atrial fibrillaton/flutter- started on eliquis and diltiazem. Beta blocker discontinued with low blood pressure episodes. Has not seen cardio since hospitalization and needs new referral. Denies difficult with anticoagulation  Notes episodes with very high heart rate several times each week. No chest pain, dizziness but she must sit down until it resolves    GERD-well controlled on PPI. Allergic rhinitis-occasional exac, using zyrtec occasinoally    Generalized osteoarthritis-follows with rheumatology. She is using diclofenac gel and Tylenol as needed. Considering Cymbalta in the future    Osteopenia-last DEXA scan was 2012 with osteopenia high-grade. She has had her left hip replaced and back surgery. She has an order for DEXA scan from her rheumatologist- has now scheduled    Bipolar disorder-managed by psychiatry. Now off seroquel. on zoloft. Denies symptoms at this time  Insomnia- on temazepam through psychiatry    Patient's medications, allergies, past medical, surgical, social and family histories were reviewed and updated as appropriate.          OBJECTIVE:  Vitals:    11/08/21 0957   BP: 138/80   Site: Right Upper Arm   Position: Sitting   Cuff Size: Medium Adult   Pulse: 61   Resp: 16   Temp: 97.4 °F (36.3 °C)   TempSrc: Temporal   SpO2: 98%   Weight: 177 lb 6.4 oz (80.5 kg)   Height: 5' 5\" (1.651 m)      Body mass index is 29.52 kg/m². Physical Exam  Vitals reviewed. Constitutional:       Appearance: Normal appearance. HENT:      Head: Normocephalic and atraumatic. Right Ear: External ear normal.      Left Ear: External ear normal.   Eyes:      General: No scleral icterus. Conjunctiva/sclera: Conjunctivae normal.   Neck:      Thyroid: No thyroid mass, thyromegaly or thyroid tenderness. Cardiovascular:      Rate and Rhythm: Normal rate. Rhythm irregular. Pulses: Normal pulses. Heart sounds: Normal heart sounds. Pulmonary:      Effort: Pulmonary effort is normal.      Breath sounds: Normal breath sounds. No wheezing, rhonchi or rales. Abdominal:      Palpations: Abdomen is soft. Tenderness: There is no abdominal tenderness. There is no guarding or rebound. Musculoskeletal:      Cervical back: Neck supple. No rigidity. No muscular tenderness. Right lower leg: No edema. Left lower leg: No edema. Lymphadenopathy:      Cervical: No cervical adenopathy. Skin:     General: Skin is warm and dry. Findings: No rash. Neurological:      General: No focal deficit present. Mental Status: She is alert and oriented to person, place, and time. Cranial Nerves: No cranial nerve deficit. Sensory: No sensory deficit. Motor: No weakness. Coordination: Coordination normal.      Gait: Gait normal.   Psychiatric:         Mood and Affect: Mood normal.         Behavior: Behavior normal.         Thought Content: Thought content normal.         Judgment: Judgment normal.         ASSESSMENT:  1. Mixed hyperlipidemia    2. Acquired hypothyroidism    3. Paroxysmal atrial fibrillation (HCC)    4. Need for vaccination         PLAN:   1. Mixed hyperlipidemia  Do recommend start of statin medication. Will begin with lower dose and likely need to adjust treatment with next labs. Come fasting to next appt  - atorvastatin (LIPITOR) 20 MG tablet;  Take 1 tablet by mouth daily  Dispense: 30 tablet; Refill: 1    2. Acquired hypothyroidism  Well replaced. Continue current dosing    3. Paroxysmal atrial fibrillation (HCC)  On eliquis and cardizem, having episodes of tachycardia  - Ambulatory referral to Cardiology    4. Need for vaccination    - INFLUENZA, QUADV, ADJUVANTED, 65 YRS =, IM, PF, PREFILL SYR, 0.5ML (FLUAD)      Return in about 6 weeks (around 12/20/2021). Electronically signed by Chaz Pete MD on 11/8/2021 at 12:44 PM.    Please note this chart was generated using dragon dictation software. Although every effort was made to ensure the accuracy of this automated transcription, some errors in transcription may have occurred.

## 2021-11-08 ENCOUNTER — OFFICE VISIT (OUTPATIENT)
Dept: PRIMARY CARE CLINIC | Age: 76
End: 2021-11-08
Payer: MEDICARE

## 2021-11-08 VITALS
WEIGHT: 177.4 LBS | SYSTOLIC BLOOD PRESSURE: 138 MMHG | RESPIRATION RATE: 16 BRPM | BODY MASS INDEX: 29.56 KG/M2 | DIASTOLIC BLOOD PRESSURE: 80 MMHG | HEIGHT: 65 IN | HEART RATE: 61 BPM | OXYGEN SATURATION: 98 % | TEMPERATURE: 97.4 F

## 2021-11-08 DIAGNOSIS — I48.0 PAROXYSMAL ATRIAL FIBRILLATION (HCC): ICD-10-CM

## 2021-11-08 DIAGNOSIS — E78.2 MIXED HYPERLIPIDEMIA: Primary | ICD-10-CM

## 2021-11-08 DIAGNOSIS — E03.9 ACQUIRED HYPOTHYROIDISM: ICD-10-CM

## 2021-11-08 DIAGNOSIS — Z23 NEED FOR VACCINATION: ICD-10-CM

## 2021-11-08 PROCEDURE — G8484 FLU IMMUNIZE NO ADMIN: HCPCS | Performed by: FAMILY MEDICINE

## 2021-11-08 PROCEDURE — 4040F PNEUMOC VAC/ADMIN/RCVD: CPT | Performed by: FAMILY MEDICINE

## 2021-11-08 PROCEDURE — 1090F PRES/ABSN URINE INCON ASSESS: CPT | Performed by: FAMILY MEDICINE

## 2021-11-08 PROCEDURE — G0008 ADMIN INFLUENZA VIRUS VAC: HCPCS | Performed by: FAMILY MEDICINE

## 2021-11-08 PROCEDURE — 90694 VACC AIIV4 NO PRSRV 0.5ML IM: CPT | Performed by: FAMILY MEDICINE

## 2021-11-08 PROCEDURE — 99214 OFFICE O/P EST MOD 30 MIN: CPT | Performed by: FAMILY MEDICINE

## 2021-11-08 PROCEDURE — 1036F TOBACCO NON-USER: CPT | Performed by: FAMILY MEDICINE

## 2021-11-08 PROCEDURE — G8399 PT W/DXA RESULTS DOCUMENT: HCPCS | Performed by: FAMILY MEDICINE

## 2021-11-08 PROCEDURE — 1123F ACP DISCUSS/DSCN MKR DOCD: CPT | Performed by: FAMILY MEDICINE

## 2021-11-08 PROCEDURE — G8417 CALC BMI ABV UP PARAM F/U: HCPCS | Performed by: FAMILY MEDICINE

## 2021-11-08 PROCEDURE — 3017F COLORECTAL CA SCREEN DOC REV: CPT | Performed by: FAMILY MEDICINE

## 2021-11-08 PROCEDURE — G8427 DOCREV CUR MEDS BY ELIG CLIN: HCPCS | Performed by: FAMILY MEDICINE

## 2021-11-08 RX ORDER — HYOSCYAMINE SULFATE 0.125 MG
TABLET ORAL
COMMUNITY
Start: 2021-09-28 | End: 2021-12-22

## 2021-11-08 RX ORDER — ATORVASTATIN CALCIUM 20 MG/1
20 TABLET, FILM COATED ORAL DAILY
Qty: 30 TABLET | Refills: 1 | Status: SHIPPED | OUTPATIENT
Start: 2021-11-08 | End: 2022-01-12

## 2021-11-08 RX ORDER — LEVOTHYROXINE SODIUM 0.05 MG/1
TABLET ORAL
Qty: 90 TABLET | Refills: 0 | Status: SHIPPED | OUTPATIENT
Start: 2021-11-08 | End: 2022-01-20

## 2021-11-24 ENCOUNTER — HOSPITAL ENCOUNTER (OUTPATIENT)
Dept: GENERAL RADIOLOGY | Age: 76
Discharge: HOME | End: 2021-11-24
Payer: MEDICARE

## 2021-11-24 DIAGNOSIS — M81.0 SENILE OSTEOPOROSIS: ICD-10-CM

## 2021-11-24 PROCEDURE — 77080 DXA BONE DENSITY AXIAL: CPT

## 2021-12-21 ENCOUNTER — TELEPHONE (OUTPATIENT)
Dept: PRIMARY CARE CLINIC | Age: 76
End: 2021-12-21

## 2021-12-21 ENCOUNTER — OFFICE VISIT (OUTPATIENT)
Dept: PRIMARY CARE CLINIC | Age: 76
End: 2021-12-21
Payer: MEDICARE

## 2021-12-21 DIAGNOSIS — E03.9 ACQUIRED HYPOTHYROIDISM: ICD-10-CM

## 2021-12-21 DIAGNOSIS — L30.4 INTERTRIGO: ICD-10-CM

## 2021-12-21 DIAGNOSIS — E78.2 MIXED HYPERLIPIDEMIA: Primary | ICD-10-CM

## 2021-12-21 DIAGNOSIS — I48.20 CHRONIC ATRIAL FIBRILLATION (HCC): ICD-10-CM

## 2021-12-21 LAB
A/G RATIO: 1.3 (ref 1.1–2.2)
ALBUMIN SERPL-MCNC: 4.4 G/DL (ref 3.4–5)
ALP BLD-CCNC: 107 U/L (ref 40–129)
ALT SERPL-CCNC: 15 U/L (ref 10–40)
ANION GAP SERPL CALCULATED.3IONS-SCNC: 13 MMOL/L (ref 3–16)
AST SERPL-CCNC: 24 U/L (ref 15–37)
BILIRUB SERPL-MCNC: 0.3 MG/DL (ref 0–1)
BUN BLDV-MCNC: 21 MG/DL (ref 7–20)
CALCIUM SERPL-MCNC: 9.9 MG/DL (ref 8.3–10.6)
CHLORIDE BLD-SCNC: 107 MMOL/L (ref 99–110)
CHOLESTEROL, TOTAL: 137 MG/DL (ref 0–199)
CO2: 21 MMOL/L (ref 21–32)
CREAT SERPL-MCNC: 1 MG/DL (ref 0.6–1.2)
GFR AFRICAN AMERICAN: >60
GFR NON-AFRICAN AMERICAN: 54
GLUCOSE BLD-MCNC: 100 MG/DL (ref 70–99)
HDLC SERPL-MCNC: 41 MG/DL (ref 40–60)
LDL CHOLESTEROL CALCULATED: ABNORMAL MG/DL
LDL CHOLESTEROL DIRECT: 41 MG/DL
POTASSIUM SERPL-SCNC: 4.7 MMOL/L (ref 3.5–5.1)
SODIUM BLD-SCNC: 141 MMOL/L (ref 136–145)
TOTAL PROTEIN: 7.8 G/DL (ref 6.4–8.2)
TRIGL SERPL-MCNC: 321 MG/DL (ref 0–150)
TSH REFLEX: 2.24 UIU/ML (ref 0.27–4.2)
VLDLC SERPL CALC-MCNC: ABNORMAL MG/DL

## 2021-12-21 PROCEDURE — 93000 ELECTROCARDIOGRAM COMPLETE: CPT | Performed by: FAMILY MEDICINE

## 2021-12-21 PROCEDURE — G8484 FLU IMMUNIZE NO ADMIN: HCPCS | Performed by: FAMILY MEDICINE

## 2021-12-21 PROCEDURE — G8427 DOCREV CUR MEDS BY ELIG CLIN: HCPCS | Performed by: FAMILY MEDICINE

## 2021-12-21 PROCEDURE — G8399 PT W/DXA RESULTS DOCUMENT: HCPCS | Performed by: FAMILY MEDICINE

## 2021-12-21 PROCEDURE — 36415 COLL VENOUS BLD VENIPUNCTURE: CPT | Performed by: FAMILY MEDICINE

## 2021-12-21 PROCEDURE — 1090F PRES/ABSN URINE INCON ASSESS: CPT | Performed by: FAMILY MEDICINE

## 2021-12-21 PROCEDURE — G8417 CALC BMI ABV UP PARAM F/U: HCPCS | Performed by: FAMILY MEDICINE

## 2021-12-21 PROCEDURE — 99214 OFFICE O/P EST MOD 30 MIN: CPT | Performed by: FAMILY MEDICINE

## 2021-12-21 PROCEDURE — 4040F PNEUMOC VAC/ADMIN/RCVD: CPT | Performed by: FAMILY MEDICINE

## 2021-12-21 PROCEDURE — 3017F COLORECTAL CA SCREEN DOC REV: CPT | Performed by: FAMILY MEDICINE

## 2021-12-21 PROCEDURE — 1123F ACP DISCUSS/DSCN MKR DOCD: CPT | Performed by: FAMILY MEDICINE

## 2021-12-21 PROCEDURE — 1036F TOBACCO NON-USER: CPT | Performed by: FAMILY MEDICINE

## 2021-12-21 RX ORDER — ATENOLOL 25 MG/1
25 TABLET ORAL DAILY
Qty: 30 TABLET | Refills: 1 | Status: SHIPPED | OUTPATIENT
Start: 2021-12-21 | End: 2022-01-06 | Stop reason: ALTCHOICE

## 2021-12-21 RX ORDER — NYSTATIN 100000 U/G
CREAM TOPICAL
Qty: 30 G | Refills: 0 | Status: SHIPPED | OUTPATIENT
Start: 2021-12-21 | End: 2022-02-01

## 2021-12-21 NOTE — TELEPHONE ENCOUNTER
----- Message from Renuka James MD sent at 12/21/2021 11:47 AM EST -----  Regarding: records  Please obtain all cardiology records from [facility]- any ekg, imaging, stress testing

## 2021-12-21 NOTE — PROGRESS NOTES
Intertrigo        PROGRESS NOTE  Date of Service:  12/21/2021    SUBJECTIVE:  Patient ID: Caridad Carmona is a 77 y.o. female    HPI:   Onset 1 week ago with itchy, burning rash under her left breast  - and now right. No f/c/drainage    Hypothyroidism-on thyroid replacement. Labs 8/21 show tsh in nl range, + fatigue    Secondary hypertension-follows with nephrology. Medications adjusted for low blood pressure and hyperkalemia    Hyperkalemia-she is closely following a low potassium diet but does find this difficult to do.follows with nephrology regularly    Hyperlipidemia- started atorvastatin; denies side effects  Labs 8/21 TC  TRG    6/21diagnosed with atrial fibrillaton/flutter- started on eliquis and diltiazem. Beta blocker discontinued with low blood pressure episodes at that time/. Has not seen cardio since hospitalization and needs new referral. Denies difficult with anticoagulation  12/21/21 she Notes episodes with very high heart rate several times each week. No chest pain, dizziness but she must sit down until it resolves. blood pressure has not been low when she checks  Reports cardio appt was cancelled and she rescheduled for Jan      GERD- stopped her PPI and reports symptoms remain controlled    Allergic rhinitis-occasional exac, using zyrtec occasinally    Generalized osteoarthritis-follows with rheumatology. She is using diclofenac gel and Tylenol as needed. Considering Cymbalta in the future    Osteopenia- dexa completed 11/21 improved BMD by 15 % from previous    Bipolar disorder-managed by psychiatry. Now off seroquel. on zoloft. Denies symptoms at this time  Insomnia- on temazepam through psychiatry    Patient's medications, allergies, past medical, surgical, social and family histories were reviewed and updated as appropriate.          OBJECTIVE:  Vitals:    12/21/21 1038 12/21/21 1043   BP: (!) 132/96 132/88   Site: Right Upper Arm    Position: Sitting    Cuff Size: Large Adult    Pulse: 142 Resp: 16    Temp: 96.7 °F (35.9 °C)    TempSrc: Temporal    SpO2: 97%    Weight: 180 lb 9.6 oz (81.9 kg)    Height: 5' 5\" (1.651 m)       Body mass index is 30.05 kg/m². Physical Exam  Vitals reviewed. Constitutional:       Appearance: Normal appearance. HENT:      Head: Normocephalic and atraumatic. Cardiovascular:      Rate and Rhythm: Tachycardia present. Pulmonary:      Breath sounds: Normal breath sounds. No wheezing, rhonchi or rales. Musculoskeletal:      Right lower leg: No edema. Left lower leg: No edema. Skin:     Comments: 20 cm circular area erythema, few papules at left breast, right breast with 10 cm area erythema   Neurological:      General: No focal deficit present. Mental Status: She is alert and oriented to person, place, and time. Cranial Nerves: No cranial nerve deficit. ASSESSMENT:  1. Mixed hyperlipidemia    2. Acquired hypothyroidism    3. Chronic atrial fibrillation ([address]    4. Intertrigo         PLAN:   1. Mixed hyperlipidemia  Assess response to statin. Discussed other medications can be added for improved TRG control  - Comprehensive Metabolic Panel  - Lipid Panel    2. Acquired hypothyroidism  Assess level and adjust as needed  - TSH with Reflex    3. Chronic atrial fibrillation (HCC)  EKg in office without change from EKG from [facility] 6/21. Will restart beta blocker for rate control. Continue cardizem and anticoagulation. appt with cardio is scheduled for Jan  - EKG 12 Lead    4. Intertrigo  Recommend air exposure when possible. May use hydrocortisone for itching as needed. - nystatin (MYCOSTATIN) 382997 UNIT/GM cream; Apply topically 2 times daily. Dispense: 30 g; Refill: 0      Return for with testing results. Electronically signed by Vanita Gonsales MD on 12/21/2021 at 2:31 PM.    Please note this chart was generated using dragon dictation software.   Although every effort was made to ensure the accuracy of this automated transcription, some errors in transcription may have occurred.

## 2021-12-21 NOTE — PATIENT INSTRUCTIONS
Patient Education        Yeast Skin Infection: Care Instructions  Your Care Instructions     Yeast normally lives on your skin. Sometimes too much yeast can overgrow in certain areas of the skin and cause an infection. The infection causes red, scaly, moist patches on your skin that may itch. Common areas for skin yeast infections are skin folds under the breasts or belly area. The warm and moist areas in the skin folds can make it easier for yeast to overgrow. Yeast infections also can be found on other parts of the body such as the groin or armpits. You will probably get a cream or ointment that contains an antifungal medicine. Examples of these are miconazole and clotrimazole. You put it on your skin to treat the infection. Your doctor may give you a prescription for the cream or ointment. Or you may be able to buy it without a prescription at most drugstores. If the infection is severe, the doctor will prescribe antifungal pills. A yeast infection usually goes away after about a week of treatment. But it's important to use the medicine for as long as your doctor tells you to. Follow-up care is a key part of your treatment and safety. Be sure to make and go to all appointments, and call your doctor if you are having problems. It's also a good idea to know your test results and keep a list of the medicines you take. How can you care for yourself at home? · Be safe with medicines. Take your medicines exactly as prescribed. Call your doctor if you think you are having a problem with your medicine. · Keep your skin clean and dry. Your doctor may suggest using powder that contains an antifungal medicine in the skin folds. · Wear loose clothing. When should you call for help? Call your doctor now or seek immediate medical care if:    · You have symptoms of infection, such as:  ? Increased pain, swelling, warmth, or redness. ? Red streaks leading from the area. ? Pus draining from the area. ? A fever. Watch closely for changes in your health, and be sure to contact your doctor if:    · You do not get better as expected. Where can you learn more? Go to [email or web address].[email or web address] org and sign in to your [employer] account. Enter N707 in the MD-IT box to learn more about \"Yeast Skin Infection: Care Instructions. \"     If you do not have an account, please click on the \"Sign Up Now\" link. Current as of: March 3, 2021               Content Version: 13.0  © 5059-1380 Healthwise, Incorporated. Care instructions adapted under license by [facility] ([facility]). If you have questions about a medical condition or this instruction, always ask your healthcare professional. [address] any warranty or liability for your use of this information.

## 2021-12-22 VITALS
OXYGEN SATURATION: 97 % | TEMPERATURE: 96.7 F | HEART RATE: 142 BPM | HEIGHT: 65 IN | DIASTOLIC BLOOD PRESSURE: 88 MMHG | SYSTOLIC BLOOD PRESSURE: 132 MMHG | BODY MASS INDEX: 30.09 KG/M2 | RESPIRATION RATE: 16 BRPM | WEIGHT: 180.6 LBS

## 2021-12-23 RX ORDER — ICOSAPENT ETHYL 1000 MG/1
2 CAPSULE ORAL 2 TIMES DAILY
Qty: 60 CAPSULE | Refills: 3 | Status: SHIPPED | OUTPATIENT
Start: 2021-12-23 | End: 2021-12-23

## 2021-12-23 RX ORDER — FENOFIBRATE 160 MG/1
160 TABLET ORAL DAILY
Qty: 30 TABLET | Refills: 2 | Status: SHIPPED
Start: 2021-12-23 | End: 2022-02-01 | Stop reason: SINTOL

## 2021-12-23 NOTE — PROGRESS NOTES
Initially considered vascepa; however with a fib/flutter will change to fenofibrate instead. No answer at pharmacy. Sent message and left voice mail with pharmacy to cancel vascepa rx.

## 2021-12-29 ENCOUNTER — PROCEDURE VISIT (OUTPATIENT)
Dept: AUDIOLOGY | Age: 76
End: 2021-12-29
Payer: MEDICARE

## 2021-12-29 DIAGNOSIS — H90.3 SENSORINEURAL HEARING LOSS, BILATERAL: Primary | ICD-10-CM

## 2021-12-29 DIAGNOSIS — H93.13 TINNITUS, BILATERAL: ICD-10-CM

## 2021-12-29 PROCEDURE — 92557 COMPREHENSIVE HEARING TEST: CPT | Performed by: AUDIOLOGIST

## 2021-12-29 PROCEDURE — 92567 TYMPANOMETRY: CPT | Performed by: AUDIOLOGIST
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
## 2021-12-29 NOTE — Clinical Note
Dr. Marylee Solomon,    Thank you for your referral for audiologic testing on this patient. Today's results are consistent with bilateral sensorineural hearing loss; right ear with poor word recognition and hypermobile TM and left ear with fair word recognition and normal middle ear function. Hearing loss is significant enough to result in difficulty understanding speech in most listening environments. Discussed good communication strategies and recommended use of hearing aids. Recommended follow-up with dispensing audiologist for programming of current devices, or to consider new technology given age of hearing aids (over 21 years). If you have any questions, or if there is anything else you need, please let me know.       Best,    [address], [place]  Audiologist  ---  [address] [facility] - Audiology

## 2021-12-29 NOTE — PATIENT INSTRUCTIONS

## 2021-12-30 NOTE — PROGRESS NOTES
[facility]     Outpatient Cardiology         Chief Complaint   Patient presents with    Atrial Fibrillation       HPI      Emily Sanchez a 77 y.o. female here for new onset afib. Referred by PCP. Hx of HLD, Hyperkalemia follows Nephrology. She does have chronic A. fib/a flutter ongoing for the past few months. Today in the office she is actually tachycardic with a heart rate in the 130s and atrial flutter. She takes atenolol and diltiazem and also Eliquis for stroke prophylaxis. From a cardiovascular perspective she feels completely asymptomatic. Today we discussed further medication changes versus sending her to the emergency room for further management.   She would like to try medications first.      PMH  Past Medical History:   Diagnosis Date    Allergic rhinitis     Depression     Diverticulosis     Fibrocystic disease of breast     Gastroesophageal reflux disease without esophagitis 06/29/2021    Hyperlipidemia     Hypothyroidism     Malleolar fracture *Aug., 2020    right    Metatarsal fracture *Aug., 2020    right - 5th    Osteopenia DEXA - Nov., 2013    Lumbar T score + 1.9 & Hip T score - 1.2    Paroxysmal atrial fibrillation (HCC) 06/29/2021    Seasonal allergic rhinitis due to pollen 06/29/2021    Secondary hypertension     Vitamin D deficiency disease 2013    Level - 17       PSH  Past Surgical History:   Procedure Laterality Date    BACK SURGERY       X 2    BLADDER SUSPENSION  Nov., 2001    CATARACT REMOVAL  June, 2006    Right    CATARACT REMOVAL  July, 2007    Left    CHOLECYSTECTOMY, LAPAROSCOPIC  June, 2002    COLONOSCOPY  Oct., 2007 ( 2017 )    Dr. Moreno Garcia - single diverticula    COLONOSCOPY  Aug., 2012 ( 2017 )    Dr. Nick Loja - tubular adenoma    COLONOSCOPY  Jan., 2016 ( 2026 )    Dr. Nick Loja - dense diverticulosis    CYSTOSCOPY  Oct., 2014    Dr. Anuel Mckenzie  *June 17, 2020    Dr. Eber Rutledge - mild bullous edema of the bladder wall    INCISIONAL HERNIA REPAIR  2009    Dr. Jose Jacobs  [address]   2010    Dr. Angeles Rebolledo  May, 2001 (  )    Diverticulosis    ELIAS AND BSO      TOE SURGERY  Oct. 2007    Biopsy Right toe mass - no significant drainage.  TOTAL HIP ARTHROPLASTY       Dr. Bruce Harrington - left    UPPER GASTROINTESTINAL ENDOSCOPY      Dr. Linda Vela- small biopsies negative, nl esoph, mild gastritis    UPPER GASTROINTESTINAL ENDOSCOPY  2015    Dr. Linda Vela - moderate gastritis with coffee grounds, mild duodenitis    UPPER GASTROINTESTINAL ENDOSCOPY  2016    Dr. Linda Vela - mild, patchy gastritis    URETHROPEXY  2001    TVT    VENTRAL HERNIA REPAIR              Social HIstory  Social History     Tobacco Use    Smoking status: Former Smoker     Packs/day: 0.20     Years: 20.00     Pack years: 4.00     Types: Cigarettes     Quit date: 1989     Years since quittin.0    Smokeless tobacco: Never Used   Vaping Use    Vaping Use: Never used   Substance Use Topics    Alcohol use: Yes     Alcohol/week: 1.0 standard drink     Types: 1 Shots of liquor per week     Comment: rare if ever alcohol    Drug use: No       Family History  Family History   Problem Relation Age of Onset    Cancer Father 64        , carcinoma of the lung.  Other Mother 64        , suicide       Allergies   Allergies   Allergen Reactions    Penicillins Anaphylaxis    Bactrim [Sulfamethoxazole-Trimethoprim] Rash    Morphine Nausea And Vomiting       Medications:     Home Medications:  Were reviewed and are listed in nursing record. and/or listed below    Prior to Admission medications    Medication Sig Start Date End Date Taking?  Authorizing Provider   pantoprazole (PROTONIX) 40 MG tablet  22  Yes Historical Provider, MD   topiramate (TOPAMAX) 200 MG tablet Take 200 mg by mouth daily   Yes Historical Provider, MD   metoprolol tartrate (LOPRESSOR) 25 MG tablet Take 1 tablet by mouth 2 times daily 1/6/22  Yes Jessee Mann MD   nystatin (MYCOSTATIN) 976945 UNIT/GM cream Apply topically 2 times daily. 12/21/21  Yes Dwayne Fonseca MD   sertraline (ZOLOFT) 50 MG tablet Take 50 mg by mouth daily   Yes Historical Provider, MD   atorvastatin (LIPITOR) 20 MG tablet Take 1 tablet by mouth daily 11/8/21  Yes Dwayne Fonseca MD   levothyroxine (SYNTHROID) 50 MCG tablet TAKE ONE TABLET BY MOUTH DAILY 11/8/21  Yes Dwayne Fonseca MD   dilTIAZem [facility]) 300 MG extended release capsule Take 1 capsule by mouth daily 8/22/21  Yes Dwayne Fonseca MD   ELIQUIS 5 MG TABS tablet Take 1 tablet by mouth 2 times daily 8/22/21 2/18/22 Yes Dwayne Fonseca MD   diclofenac sodium 1 % GEL Apply 2 g topically 2 times daily 7/31/19  Yes Jyothi Levine MD   cetirizine (ZYRTEC) 10 MG tablet Take 10 mg by mouth daily   Yes Historical Provider, MD   temazepam (RESTORIL) 30 MG capsule Take 30 mg by mouth nightly as needed for Sleep. Yes Historical Provider, MD   calcium-vitamin D (OSCAL-500) 500-200 MG-UNIT per tablet Take 1 tablet by mouth daily   Yes Historical Provider, MD   Cholecalciferol (VITAMIN D3) 2000 UNITS CAPS Take 1 capsule by mouth daily    Yes Historical Provider, MD   colestipol (COLESTID) 1 G tablet Take 4 g by mouth 2 times daily    Yes Historical Provider, MD   fenofibrate (TRIGLIDE) 160 MG tablet Take 1 tablet by mouth daily  Patient not taking: Reported on 1/6/2022 12/23/21   Dwayne Fonseca MD        Review of Systems   Constitutional: Negative for activity change, appetite change, diaphoresis, fatigue, fever and unexpected weight change. HENT: Negative for congestion, facial swelling, mouth sores and nosebleeds. Eyes: Negative for discharge and visual disturbance. Respiratory: Negative for cough, chest tightness, shortness of breath and wheezing. Cardiovascular: Negative for chest pain, palpitations and leg swelling.    Gastrointestinal: Negative for abdominal distention, abdominal pain, blood in stool and vomiting. Endocrine: Negative for cold intolerance, heat intolerance and polyuria. Genitourinary: Negative for difficulty urinating, dysuria, frequency and hematuria. Musculoskeletal: Negative for back pain, joint swelling, myalgias and neck pain. Skin: Negative for color change, pallor and rash. Allergic/Immunologic: Negative for immunocompromised state. Neurological: Negative for dizziness, syncope, weakness, light-headedness, numbness and headaches. Hematological: Negative for adenopathy. Does not bruise/bleed easily. Psychiatric/Behavioral: Negative for behavioral problems, confusion, decreased concentration and suicidal ideas. The patient is not nervous/anxious. Vitals:    01/06/22 1328   BP: 100/70   Pulse: 128    Weight: 180 lb 3.2 oz (81.7 kg)       Vitals:    01/06/22 1328   BP: 100/70   Site: Left Upper Arm   Position: Sitting   Cuff Size: Medium Adult   Pulse: 128   Weight: 180 lb 3.2 oz (81.7 kg)   Height: 5' 5.5\" (1.664 m)       BP Readings from Last 3 Encounters:   01/06/22 100/70   01/06/22 101/84   12/21/21 132/88       Wt Readings from Last 3 Encounters:   01/06/22 180 lb 3.2 oz (81.7 kg)   01/06/22 180 lb 9.6 oz (81.9 kg)   12/21/21 180 lb 9.6 oz (81.9 kg)       Physical Exam  Constitutional:       General: She is not in acute distress. Appearance: She is well-developed. She is not diaphoretic. HENT:      Head: Normocephalic and atraumatic. Eyes:      Pupils: Pupils are equal, round, and reactive to light. Neck:      Thyroid: No thyromegaly. Vascular: No JVD. Cardiovascular:      Rate and Rhythm: Normal rate and regular rhythm. Chest Wall: PMI is not displaced. Heart sounds: Normal heart sounds, S1 normal and S2 normal. No murmur heard. No friction rub. No gallop. Pulmonary:      Effort: Pulmonary effort is normal. No respiratory distress. Breath sounds: Normal breath sounds.  No stridor. No wheezing or rales. Chest:      Chest wall: No tenderness. Abdominal:      General: Bowel sounds are normal. There is no distension. Palpations: Abdomen is soft. Tenderness: There is no abdominal tenderness. There is no guarding or rebound. Musculoskeletal:         General: No tenderness. Normal range of motion. Cervical back: Normal range of motion. Lymphadenopathy:      Cervical: No cervical adenopathy. Skin:     General: Skin is warm and dry. Findings: No erythema or rash. Neurological:      Mental Status: She is alert and oriented to person, place, and time. Coordination: Coordination normal.   Psychiatric:         Behavior: Behavior normal.         Thought Content:  Thought content normal.         Judgment: Judgment normal.         Labs:       Lab Results   Component Value Date    WBC 9.1 08/10/2021    HGB 14.6 08/10/2021    HCT 46.9 (A) 08/10/2021    MCV 91.1 08/10/2021     08/10/2021     Lab Results   Component Value Date     12/21/2021    K 4.7 12/21/2021     12/21/2021    CO2 21 12/21/2021    BUN 21 (H) 12/21/2021    CREATININE 1.0 12/21/2021    GLUCOSE 100 (H) 12/21/2021    CALCIUM 9.9 12/21/2021    PROT 7.8 12/21/2021    LABALBU 4.4 12/21/2021    BILITOT 0.3 12/21/2021    ALKPHOS 107 12/21/2021    AST 24 12/21/2021    ALT 15 12/21/2021    LABGLOM 54 (A) 12/21/2021    GFRAA >60 12/21/2021    AGRATIO 1.3 12/21/2021    GLOB 2.5 06/11/2019         Lab Results   Component Value Date    CHOL 137 12/21/2021    CHOL 217 08/10/2021    CHOL 142 06/11/2019     Lab Results   Component Value Date    TRIG 321 (H) 12/21/2021    TRIG 461 08/10/2021    TRIG 204 (H) 06/11/2019     Lab Results   Component Value Date    HDL 41 12/21/2021    HDL 43 08/10/2021    HDL 42 06/11/2019     Lab Results   Component Value Date    LDLCALC see below 12/21/2021    LDLCALC 59 06/11/2019    LDLCALC 47 05/24/2018     Lab Results   Component Value Date    LABVLDL see below 12/21/2021    LABVLDL 41 06/11/2019    LABVLDL 48 05/24/2018     Lab Results   Component Value Date    CHOLHDLRATIO 43 08/10/2021       Lab Results   Component Value Date    INR 0.87 07/08/2017    PROTIME 9.8 07/08/2017       The 10-year ASCVD risk score (Ximena Hampton, et al., 2013) is: 12.9%    Values used to calculate the score:      Age: 77 years      Sex: Female      Is Non- : No      Diabetic: No      Tobacco smoker: No      Systolic Blood Pressure: 791 mmHg      Is BP treated: Yes      HDL Cholesterol: 41 mg/dL      Total Cholesterol: 137 mg/dL      Imaging:       Last ECG (if available, Personally interpreted):  Atrial flutter, rate 130s     Last Monitor/Holter  NORMAL SINUS RHYTHMMINIMUM HEART RATE: 53 BPMMAXIMUM HEART RATE: 92 BPMOCCASIONAL PREMATURE ATRIAL CONTRACTIONSNO VENTRICULAR ECTOPYNO DIARY RETUNEDConfirmed by Anali Gibson MD, Susanne Marni ([id number]) on 8/20/2015 3:59:33 PM  Last Stress (if available):    Last Cath (if available):    Last TTE/ADRIANA(if available):    Last CMR  (if available):      Assessment / Plan:     Paroxysmal atrial fibrillation ([address]  On Eliquis for stroke prophylaxis. Today in atrial flutter rate now controlled. She is hesitant to the emergency room. EP referral.  Continue diltiazem. Discontinue atenolol, start metoprolol 25 twice a day. Follow-up early next week to see if the medications are working otherwise may need to be seen in emergency room for possible cardioversion    I had the opportunity to review the clinical symptoms and presentation of Stella Schofield. Patient's allergies and medications were reviewed and updated. Patient's past medical, surgical, social and family history were reviewed and updated. Patient's testing including laboratory, ECGs, monitor, imaging (TTE,ADRIANA,CMR,cath) were reviewed. Tobacco use was discussed with the patient and educated on the negative effects. I have asked the patient to not utilize these agents.     All questions and concerns were addressed to the patient/family. Alternatives to my treatment were discussed. The note was completed using EMR. Every effort wasmade to ensure accuracy; however, inadvertent computerized transcription errors may be present. Thank you for allowing me to participate in thecare or Percy ROCHE.  Jazmin Banks MD, [facility]

## 2022-01-06 ENCOUNTER — OFFICE VISIT (OUTPATIENT)
Dept: CARDIOLOGY CLINIC | Age: 77
End: 2022-01-06
Payer: MEDICARE

## 2022-01-06 VITALS
HEART RATE: 128 BPM | SYSTOLIC BLOOD PRESSURE: 100 MMHG | DIASTOLIC BLOOD PRESSURE: 70 MMHG | BODY MASS INDEX: 28.96 KG/M2 | WEIGHT: 180.2 LBS | HEIGHT: 66 IN

## 2022-01-06 DIAGNOSIS — E78.2 MIXED HYPERLIPIDEMIA: ICD-10-CM

## 2022-01-06 DIAGNOSIS — E87.5 HYPERKALEMIA: ICD-10-CM

## 2022-01-06 DIAGNOSIS — I48.0 PAROXYSMAL ATRIAL FIBRILLATION (HCC): Primary | ICD-10-CM

## 2022-01-06 LAB
ALBUMIN SERPL-MCNC: 4.7 G/DL (ref 3.4–5)
ANION GAP SERPL CALCULATED.3IONS-SCNC: 15 MMOL/L (ref 3–16)
BUN BLDV-MCNC: 24 MG/DL (ref 7–20)
CALCIUM SERPL-MCNC: 10.3 MG/DL (ref 8.3–10.6)
CHLORIDE BLD-SCNC: 105 MMOL/L (ref 99–110)
CO2: 23 MMOL/L (ref 21–32)
CREAT SERPL-MCNC: 1.5 MG/DL (ref 0.6–1.2)
GFR AFRICAN AMERICAN: 41
GFR NON-AFRICAN AMERICAN: 34
GLUCOSE BLD-MCNC: 98 MG/DL (ref 70–99)
PHOSPHORUS: 4.8 MG/DL (ref 2.5–4.9)
POTASSIUM SERPL-SCNC: 4.8 MMOL/L (ref 3.5–5.1)
SODIUM BLD-SCNC: 143 MMOL/L (ref 136–145)

## 2022-01-06 PROCEDURE — 1090F PRES/ABSN URINE INCON ASSESS: CPT | Performed by: INTERNAL MEDICINE

## 2022-01-06 PROCEDURE — 99204 OFFICE O/P NEW MOD 45 MIN: CPT | Performed by: INTERNAL MEDICINE

## 2022-01-06 PROCEDURE — 3017F COLORECTAL CA SCREEN DOC REV: CPT | Performed by: INTERNAL MEDICINE

## 2022-01-06 PROCEDURE — 93000 ELECTROCARDIOGRAM COMPLETE: CPT | Performed by: INTERNAL MEDICINE

## 2022-01-06 PROCEDURE — 1123F ACP DISCUSS/DSCN MKR DOCD: CPT | Performed by: INTERNAL MEDICINE

## 2022-01-06 PROCEDURE — G8399 PT W/DXA RESULTS DOCUMENT: HCPCS | Performed by: INTERNAL MEDICINE

## 2022-01-06 PROCEDURE — G8427 DOCREV CUR MEDS BY ELIG CLIN: HCPCS | Performed by: INTERNAL MEDICINE

## 2022-01-06 PROCEDURE — 1036F TOBACCO NON-USER: CPT | Performed by: INTERNAL MEDICINE

## 2022-01-06 PROCEDURE — G8484 FLU IMMUNIZE NO ADMIN: HCPCS | Performed by: INTERNAL MEDICINE

## 2022-01-06 PROCEDURE — G8417 CALC BMI ABV UP PARAM F/U: HCPCS | Performed by: INTERNAL MEDICINE

## 2022-01-06 PROCEDURE — 4040F PNEUMOC VAC/ADMIN/RCVD: CPT | Performed by: INTERNAL MEDICINE

## 2022-01-06 ASSESSMENT — ENCOUNTER SYMPTOMS
WHEEZING: 0
FACIAL SWELLING: 0
CHEST TIGHTNESS: 0
ABDOMINAL PAIN: 0
ABDOMINAL DISTENTION: 0
SHORTNESS OF BREATH: 0
COUGH: 0
BLOOD IN STOOL: 0
EYE DISCHARGE: 0
VOMITING: 0
BACK PAIN: 0
COLOR CHANGE: 0

## 2022-01-06 NOTE — ASSESSMENT & PLAN NOTE
On Eliquis for stroke prophylaxis. Today in atrial flutter rate now controlled. She is hesitant to the emergency room. EP referral.  Continue diltiazem. Discontinue atenolol, start metoprolol 25 twice a day.   Follow-up early next week to see if the medications are working otherwise may need to be seen in emergency room for possible cardioversion

## 2022-01-10 NOTE — PROGRESS NOTES
[facility], [place], operated by [facility]   Electrophysiology  New patient visit  Date: 1/12/2022    Chief Complaint   Patient presents with    Atrial Fibrillation    Hypertension    Palpitations    Shortness of Breath       Cardiac HX: Sandy Diehl is a 77 y.o. woman w/ PMH HTN, HLD, hypothyroidism, bipolar disorder, paroxysmal AF/AFL originally diagnosed 6/2021 (at [facility]), placed on Eliquis and diltiazem    Interval History/HPI: Patient is here for follow-up for atrial fibrillation/atrial flutter. She was originally diagnosed with AF in June 2021 at [facility] and was placed on Eliquis and diltiazem at that time. States that prior to her hospitalization she had just received the [address] and started feeling palpitations, shortness of breath and went to the ER. Unfortunately records are not available to view. She was seen by Dr. Marissa Moody last week and was noted to be in atrial flutter with poorly controlled ventricular rate. Her atenolol was changed to metoprolol. Today she presents in AFL, . She states she has been taking her medications as prescribed however upon review she has only been taking her Lopressor once a day. She endorses feeling short of breath, her heart racing, palpitations while out of rhythm. Pt denies  dizziness, lightheadedness. No CP, PND, orthopnea, BLE edema. BP well controlled. Quit smoking cigarettes in 6/2021. She drinks alcohol occasionally, 2 cups of coffee a day, states she is well-hydrated. She is a retired registered nurse. Home medications:   Current Outpatient Medications on File Prior to Visit   Medication Sig Dispense Refill    pantoprazole (PROTONIX) 40 MG tablet       topiramate (TOPAMAX) 200 MG tablet Take 200 mg by mouth daily      fenofibrate (TRIGLIDE) 160 MG tablet Take 1 tablet by mouth daily (Patient not taking: Reported on 1/6/2022) 30 tablet 2    nystatin (MYCOSTATIN) 921048 UNIT/GM cream Apply topically 2 times daily. 30 g 0    sertraline (ZOLOFT) 50 MG tablet Take 50 mg by mouth daily      atorvastatin (LIPITOR) 20 MG tablet Take 1 tablet by mouth daily 30 tablet 1    levothyroxine (SYNTHROID) 50 MCG tablet TAKE ONE TABLET BY MOUTH DAILY 90 tablet 0    ELIQUIS 5 MG TABS tablet Take 1 tablet by mouth 2 times daily 180 tablet 1    diclofenac sodium 1 % GEL Apply 2 g topically 2 times daily 1 Tube 3    cetirizine (ZYRTEC) 10 MG tablet Take 10 mg by mouth daily      temazepam (RESTORIL) 30 MG capsule Take 30 mg by mouth nightly as needed for Sleep.  calcium-vitamin D (OSCAL-500) 500-200 MG-UNIT per tablet Take 1 tablet by mouth daily      Cholecalciferol (VITAMIN D3) 2000 UNITS CAPS Take 1 capsule by mouth daily       colestipol (COLESTID) 1 G tablet Take 4 g by mouth 2 times daily        No current facility-administered medications on file prior to visit.        Past Medical History:   Diagnosis Date    Allergic rhinitis     Depression     Diverticulosis     Fibrocystic disease of breast     Gastroesophageal reflux disease without esophagitis 06/29/2021    Hyperlipidemia     Hypothyroidism     Malleolar fracture *Aug., 2020    right    Metatarsal fracture *Aug., 2020    right - 5th    Osteopenia DEXA - Nov., 2013    Lumbar T score + 1.9 & Hip T score - 1.2    Paroxysmal atrial fibrillation (HCC) 06/29/2021    Seasonal allergic rhinitis due to pollen 06/29/2021    Secondary hypertension     Vitamin D deficiency disease 2013    Level - 17        Past Surgical History:   Procedure Laterality Date    BACK SURGERY       X 2    BLADDER SUSPENSION  Nov., 2001    CATARACT REMOVAL  June, 2006    Right    CATARACT REMOVAL  July, 2007    Left    CHOLECYSTECTOMY, LAPAROSCOPIC  June, 2002    COLONOSCOPY  Oct., 2007 ( 2017 )    Dr. Kalie Danielel - single diverticula    COLONOSCOPY  Aug., 2012 ( 2017 )    Dr. Aydin Hernandez - tubular adenoma    COLONOSCOPY  Jan., 2016 ( 2026 )    Dr. Aydin Hernandez - dense diverticulosis    CYSTOSCOPY Oct., 2014    Dr. Kenneth Brown  *June 17, 2020    Dr. Aline Fischer - mild bullous edema of the bladder wall   [facility] [place] HERNIA REPAIR  Nov., 2009    Dr. Maricarmen Badillo  [address]   Nov., 2010    Dr. Marry Ramos  May, 2001 ( 2006 )    Diverticulosis    ELIAS AND BSO      TOE SURGERY  Oct. 2007    Biopsy Right toe mass - no significant drainage.  TOTAL HIP ARTHROPLASTY  August, 2012     Dr. Christal Louie - left    UPPER GASTROINTESTINAL ENDOSCOPY  August, 2012    Dr. Brnenan Wetzel- small biopsies negative, nl esoph, mild gastritis    UPPER GASTROINTESTINAL ENDOSCOPY  Jan.30, 2015    Dr. Brennan Wetzel - moderate gastritis with coffee grounds, mild duodenitis    UPPER GASTROINTESTINAL ENDOSCOPY  Jan.18, 2016    Dr. Brennan Wetzel - mild, patchy gastritis    URETHROPEXY  Nov., 2001    TVT   [facility] HERNIA REPAIR  June, 2014           Family History:  Reviewed. family history includes Cancer (age of onset: 64) in her father; Other (age of onset: 64) in her mother. Review of System:    · Constitutional: No fevers, chills. · Eyes: No visual changes or diplopia. No scleral icterus. · ENT: No Headaches. No mouth sores or sore throat. · Cardiovascular: No for chest pain, Yes for dyspnea on exertion, Yes for palpitations or No for loss of consciousness. No cough, hemoptysis, No for pleuritic pain, or phlebitis. · Respiratory: No for cough or wheezing. No hematemesis. · Gastrointestinal: No abdominal pain, blood in stools. · Genitourinary: No dysuria, or hematuria. · Musculoskeletal: No gait disturbance,    · Integumentary: No rash or pruritis. · Neurological: No headache, change in muscle strength, numbness or tingling. · Psychiatric: No anxiety, or depression. · Endocrine: No temperature intolerance. No excessive thirst, fluid intake, or urination. · Hem/Lymph: No abnormal bruising or bleeding, blood clots or swollen lymph nodes.   · Allergic/Immunologic: No nasal congestion or hives.    Physical Examination:  Vitals:    01/12/22 1130   BP: 122/82   Pulse: 132         Wt Readings from Last 3 Encounters:   01/12/22 182 lb 9.6 oz (82.8 kg)   01/06/22 180 lb 3.2 oz (81.7 kg)   01/06/22 180 lb 9.6 oz (81.9 kg)       · Constitutional: Oriented. No distress. · Head: Normocephalic and atraumatic. · Mouth/Throat: Oropharynx is clear and moist.   · Eyes: Conjunctivae clear without jaunduice. PERRL. · Neck: Neck supple. No rigidity. No JVD present. · Cardiovascular: Normal rate, regular rhythm, S1&S2. · Pulmonary/Chest: Bilateral respiratory sounds. No wheezes, No rhonchi. · Abdominal: Soft. Bowel sounds present. No distension, No tenderness. · Musculoskeletal: No tenderness. No edema    · Lymphadenopathy: Has no cervical adenopathy. · Neurological: Alert and oriented. Cranial nerve appears intact, No Gross deficit   · Skin: Skin is warm and dry. No rash noted. · Psychiatric: Has a normal mood, affect and behavior     Labs:  Reviewed. No results for input(s): NA, K, CL, CO2, PHOS, BUN, CREATININE, CA in the last 72 hours. Invalid input(s):  TSH  No results for input(s): WBC, HGB, HCT, MCV, PLT in the last 72 hours.   Lab Results   Component Value Date    QQCCGNM 554 06/11/2019    TROPONINI 0.02 06/14/2014     No results found for: BNP  Lab Results   Component Value Date    PROTIME 9.8 07/08/2017    INR 0.87 07/08/2017     Lab Results   Component Value Date    CHOL 137 12/21/2021    HDL 41 12/21/2021    TRIG 321 12/21/2021       ECG: Personally reviewed: AFL, , QRS 88, QTc 456    ECHO:  n/a    Stress Test: n/a    Cardiac Angiography: n/a    Problem List:   Patient Active Problem List    Diagnosis Date Noted    Impaired hearing 08/09/2021    Current use of long term anticoagulation 08/09/2021    Paroxysmal atrial fibrillation (HCC) 06/29/2021    Gastroesophageal reflux disease without esophagitis 06/29/2021    Seasonal allergic rhinitis due to pollen 06/29/2021    Other secondary hypertension 06/24/2021    Osteopenia of multiple sites 06/24/2021    Generalized osteoarthritis 06/24/2021    Mixed hyperlipidemia 08/30/2012    Acquired hypothyroidism 08/30/2012    Encounter for long-term (current) use of other medications 02/11/2009    Bipolar 1 disorder ([facility] [address] 11/25/2008        Assessment:   1. Paroxysmal atrial fibrillation (HCC)    2. Paroxysmal atrial flutter ([facility] [address]    3. Benign essential HTN    4. Palpitations    5. ELLIS (dyspnea on exertion)         PAF/AFL   - In AFL,   - [id number] at least 4  - On Eliquis 5 mg twice daily, no bleeding, continue  - On metoprolol 25 mg twice daily- she has only been taking daily, will change to Toprol 50 mg QD  - Will order digoxin 125 mcg QD  - Call on Friday with heart rate  - On Dilt 300 mg daily  - Will order echo    - Will place 2-week Cam  - Discussed lifestyle modifications tobacco use, caffeine, alcohol, diet, exercise, hydration  - Patient not interested in sleep study at this time   - Will review w/ Dr. Kaia Reyes - rhythm mgmt  - F/u 4-6 weeks w/ Dr. Kaia Reyes  - ECG ordered and results personally reviewed     HTN  - BP controlled  - On Lopressor 25 mg twice daily-will change to Toprol 50 mg daily  - On diltiazem 300 mg daily  - Monitor BP x2 weeks and send office     Unknown EF  No known systolic HF  No known CAD  Anticoagulation for AF   No tobacco use     All questions and concerns were addressed to the patient/family. Alternatives to my treatment were discussed. The note was completed using EMR. Every effort was made to ensure accuracy; however, inadvertent computerized transcription errors may be present. Patient received education regarding their diagnosis, treatment and medications while in the office today.       Radha Mijares, [address]      I  have spent 65 minutes in care of the patient including direct face to face time, chart preparation, reviewing diagnostic testing, other provider notes and coordinating patient care.

## 2022-01-12 ENCOUNTER — OFFICE VISIT (OUTPATIENT)
Dept: CARDIOLOGY CLINIC | Age: 77
End: 2022-01-12
Payer: MEDICARE

## 2022-01-12 ENCOUNTER — NURSE ONLY (OUTPATIENT)
Dept: CARDIOLOGY CLINIC | Age: 77
End: 2022-01-12

## 2022-01-12 VITALS
DIASTOLIC BLOOD PRESSURE: 82 MMHG | WEIGHT: 182.6 LBS | BODY MASS INDEX: 29.35 KG/M2 | SYSTOLIC BLOOD PRESSURE: 122 MMHG | HEIGHT: 66 IN | HEART RATE: 132 BPM

## 2022-01-12 DIAGNOSIS — I48.0 PAROXYSMAL ATRIAL FIBRILLATION (HCC): Primary | ICD-10-CM

## 2022-01-12 DIAGNOSIS — R06.09 DOE (DYSPNEA ON EXERTION): ICD-10-CM

## 2022-01-12 DIAGNOSIS — R00.2 PALPITATIONS: ICD-10-CM

## 2022-01-12 DIAGNOSIS — I48.92 PAROXYSMAL ATRIAL FLUTTER (HCC): ICD-10-CM

## 2022-01-12 DIAGNOSIS — I10 BENIGN ESSENTIAL HTN: ICD-10-CM

## 2022-01-12 PROCEDURE — 1123F ACP DISCUSS/DSCN MKR DOCD: CPT | Performed by: NURSE PRACTITIONER

## 2022-01-12 PROCEDURE — 99215 OFFICE O/P EST HI 40 MIN: CPT | Performed by: NURSE PRACTITIONER

## 2022-01-12 PROCEDURE — G8427 DOCREV CUR MEDS BY ELIG CLIN: HCPCS | Performed by: NURSE PRACTITIONER

## 2022-01-12 PROCEDURE — G8417 CALC BMI ABV UP PARAM F/U: HCPCS | Performed by: NURSE PRACTITIONER

## 2022-01-12 PROCEDURE — 1036F TOBACCO NON-USER: CPT | Performed by: NURSE PRACTITIONER

## 2022-01-12 PROCEDURE — 93000 ELECTROCARDIOGRAM COMPLETE: CPT | Performed by: NURSE PRACTITIONER

## 2022-01-12 PROCEDURE — G8484 FLU IMMUNIZE NO ADMIN: HCPCS | Performed by: NURSE PRACTITIONER

## 2022-01-12 PROCEDURE — 3017F COLORECTAL CA SCREEN DOC REV: CPT | Performed by: NURSE PRACTITIONER

## 2022-01-12 PROCEDURE — 93246 EXT ECG>7D<15D RECORDING: CPT | Performed by: INTERNAL MEDICINE

## 2022-01-12 PROCEDURE — 4040F PNEUMOC VAC/ADMIN/RCVD: CPT | Performed by: NURSE PRACTITIONER

## 2022-01-12 PROCEDURE — G8399 PT W/DXA RESULTS DOCUMENT: HCPCS | Performed by: NURSE PRACTITIONER

## 2022-01-12 PROCEDURE — 1090F PRES/ABSN URINE INCON ASSESS: CPT | Performed by: NURSE PRACTITIONER

## 2022-01-12 RX ORDER — METOPROLOL SUCCINATE 50 MG/1
50 TABLET, EXTENDED RELEASE ORAL DAILY
Qty: 30 TABLET | Refills: 5 | Status: SHIPPED | OUTPATIENT
Start: 2022-01-12 | End: 2022-06-09

## 2022-01-12 RX ORDER — DIGOXIN 125 MCG
125 TABLET ORAL DAILY
Qty: 30 TABLET | Refills: 5 | Status: SHIPPED | OUTPATIENT
Start: 2022-01-12 | End: 2022-06-09

## 2022-01-12 RX ORDER — DILTIAZEM HYDROCHLORIDE 300 MG/1
300 CAPSULE, EXTENDED RELEASE ORAL DAILY
Qty: 90 CAPSULE | Refills: 3 | Status: SHIPPED | OUTPATIENT
Start: 2022-01-12

## 2022-01-12 NOTE — PATIENT INSTRUCTIONS
New medications: start taking metoprolol 50 mg every day, digoxin 125 mcg everday  Call Janene on Friday with heart rate ([phone])  Take blood pressure/ heart rate x 2 weeks and send results into office  Schedule Echocardiogram

## 2022-01-14 ENCOUNTER — TELEPHONE (OUTPATIENT)
Dept: CARDIOLOGY CLINIC | Age: 77
End: 2022-01-14

## 2022-01-14 NOTE — TELEPHONE ENCOUNTER
Pt was told by Janene to record her BP.     1/13/22-  127/90    1/14/22   88/64  HR  68 [phone]

## 2022-01-20 PROBLEM — I48.92 PAROXYSMAL ATRIAL FLUTTER (HCC): Status: ACTIVE | Noted: 2022-01-20

## 2022-01-20 PROBLEM — R06.09 DOE (DYSPNEA ON EXERTION): Status: ACTIVE | Noted: 2022-01-20

## 2022-01-20 NOTE — PROGRESS NOTES
[facility]   Cardiac Electrophysiology Consultation   Date: 1/25/2022  Reason for Consultation:   Consult Requesting Physician: No att. providers found     Chief Complaint:   Chief Complaint   Patient presents with    Follow-up     Zheng Davidr / new af        HPI: Tuan Hernandez is a 77 y.o. woman referred by Dr Jake Major w/ [facility] HTN, HLD, hypothyroidism, bipolar disorder, paroxysmal AF/AFL originally diagnosed 6/2021 (at [facility]), placed on Eliquis and diltiazem. Reports that after receiving the Jennye Shield she started feeling palpitations, shortness of breath and went to the ER at [facility], no records available for review. Noted to be in AFL with poorly controlled ventricular rate at [address] 1/6/22, and atenolol was changed to metoprolol at that time. Interval History: Today, she is here for follow up for atrial fibrillation/atrial flutter. Endorses exercising at home, ie, dancing, sit ups, push ups. States had  'double pneumonia ' after Covid vaccine. States hx Lt Hip replacement. States had 'reaction to anesthesia with prior back surgeries, urethral dilatation and hip'. Pt states after urethral procedure (June/2020, Dr Cresencio Iraheta) 'reported by MD that her behavior was such that he would no longer treat her'. Patient denies lightheadedness, dizziness, chest pain, palpitations, orthopnea, edema, presyncope or syncope. Assessment:   1. Typical atrial flutter. on Eliquis 5 mg BID, Digoxin 125 mcg daily  2. HTN, stable, Toprol 50 mg daily, Diltiazem 300 mg daily,  3. HLD, stable on statin  4. Hypothyroidism, stable    Plan:   - Treatment options including cardioversion, anti-arrhythmic medication therapy, rate control strategy, oral anticoagulation, and atrial flutter ablation were discussed with patient. Risks, benefits and alternative of each treatment options were explained. The risks, benefits and alternatives of the ablation procedure were discussed with the patient.  The risks including, but not limited to, the risks of bleeding, infection, injury to vascular, cardiac and surrounding structures (including pneumothorax), stroke, cardiac perforation, tamponade, need for emergent open heart surgery, need for pacemaker implantation, injury to the phrenic nerve, injury to the esophagus, myocardial infarction and death were discussed in detail. All questions answered. The patient does not need to hold any medication prior to this ablation. Will obtain BMP, CBC, INR/PT prior to the ablation. After long discussion, patient verbalized understanding about atrial flutter and atrial flutter ablation. She is willing to proceed. However, when she realized that she need to have a presurgical Covid screening, she completely refused to have any Covid testing. I specifically explained to her that this is different from Covid vaccination. I also explained to her that this is not only for her safety and also for the safety of the staff members and also other patients who could be going through the surgery on the same surgery room on the same day. However, patient blatantly refused to have any presurgical Covid screening test and left the office. She was given office phone number and give a call if she change her mind. Atrial Fibrillation:    BMI    :   Body mass index is 30.62 kg/m². Duration   :   June, 2021    Symptoms   : Shortness of breath and weight gain.       Previous DCCV :  none    Previous AAD           :   Diltiazem    Beta blocker  :  Toprol    ACE / ARB  :   none    OAC   :   Eliquis    LVEF    :   50-55%    Left atrial size :   Normal per June/2021 echo    BRENT   :   Not tested        Past Medical History:   Diagnosis Date    Allergic rhinitis     Depression     Diverticulosis     Fibrocystic disease of breast     Gastroesophageal reflux disease without esophagitis 06/29/2021    Hyperlipidemia     Hypothyroidism     Malleolar fracture *Aug., 2020    right    Metatarsal fracture *Aug., 2020    right - 5th    Osteopenia DEXA - Nov., 2013    Lumbar T score + 1.9 & Hip T score - 1.2    Paroxysmal atrial fibrillation (HCC) 06/29/2021    Seasonal allergic rhinitis due to pollen 06/29/2021    Secondary hypertension     Vitamin D deficiency disease 2013    Level - 17        Past Surgical History:   Procedure Laterality Date    BACK SURGERY       X 2    BLADDER SUSPENSION  Nov., 2001    CATARACT REMOVAL  June, 2006    Right    CATARACT REMOVAL  July, 2007    Left    CHOLECYSTECTOMY, LAPAROSCOPIC  June, 2002    COLONOSCOPY  Oct., 2007 ( 2017 )    Dr. [place] Pin[place] - single diverticula    COLONOSCOPY  Aug., 2012 ( 2017 )    Dr. Mariusz Schneider - tubular adenoma    COLONOSCOPY  Jan., 2016 ( 2026 )    Dr. Mariusz Schneider - dense diverticulosis    CYSTOSCOPY  Oct., 2014    Dr. Adria Boas  *June 17, 2020    Dr. Jayy Contreras - mild bullous edema of the bladder wall    INCISIONAL HERNIA REPAIR  Nov., 2009    Dr. Randal Pelletier  Dec., Alondra Seo  Nov., 2010    Dr. Rima Schuler  May, 2001 ( 2006 )    Diverticulosis    ELIAS AND BSO      TOE SURGERY  Oct. 2007    Biopsy Right toe mass - no significant drainage.  TOTAL HIP ARTHROPLASTY  August, 2012     Dr. Rachele Ashby - left    UPPER GASTROINTESTINAL ENDOSCOPY  August, 2012    Dr. Mariusz Schneider- small biopsies negative, nl esoph, mild gastritis    UPPER GASTROINTESTINAL ENDOSCOPY  Jan.30, 2015    Dr. Mariusz Schneider - moderate gastritis with coffee grounds, mild duodenitis    UPPER GASTROINTESTINAL ENDOSCOPY  Jan.18, 2016    Dr. Mariusz Schneider - mild, patchy gastritis    URETHROPEXY  Nov., 2001    TVT   [facility] HERNIA REPAIR  June, 2014           Allergies: Allergies   Allergen Reactions    Penicillins Anaphylaxis    Bactrim [Sulfamethoxazole-Trimethoprim] Rash    Morphine Nausea And Vomiting       Medication:   Prior to Admission medications    Medication Sig Start Date End Date Taking?  Authorizing Provider   levothyroxine (SYNTHROID) 50 MCG tablet TAKE ONE TABLET BY MOUTH DAILY 1/20/22  Yes Melissa Calderón MD   atorvastatin (LIPITOR) 20 MG tablet TAKE 1 TABLET BY MOUTH DAILY 1/12/22  Yes Melissa Calderón MD   metoprolol succinate (TOPROL XL) 50 MG extended release tablet Take 1 tablet by mouth daily 1/12/22  Yes LEDY Donovan CNP   digoxin (LANOXIN) 125 MCG tablet Take 1 tablet by mouth daily 1/12/22  Yes LEDY Donovan CNP   dilTIAZem [facility]) 300 MG extended release capsule Take 1 capsule by mouth daily 1/12/22  Yes LEDY Donovan CNP   pantoprazole (PROTONIX) 40 MG tablet  1/5/22  Yes Historical Provider, MD   topiramate (TOPAMAX) 200 MG tablet Take 200 mg by mouth daily   Yes Historical Provider, MD   nystatin (MYCOSTATIN) 836911 UNIT/GM cream Apply topically 2 times daily. 12/21/21  Yes Melissa Calderón MD   sertraline (ZOLOFT) 50 MG tablet Take 50 mg by mouth daily   Yes Historical Provider, MD   ELIQUIS 5 MG TABS tablet Take 1 tablet by mouth 2 times daily 8/22/21 2/18/22 Yes Melissa Calderón MD   diclofenac sodium 1 % GEL Apply 2 g topically 2 times daily 7/31/19  Yes Sloan Benitez MD   cetirizine (ZYRTEC) 10 MG tablet Take 10 mg by mouth daily   Yes Historical Provider, MD   temazepam (RESTORIL) 30 MG capsule Take 30 mg by mouth nightly as needed for Sleep. Yes Historical Provider, MD   calcium-vitamin D (OSCAL-500) 500-200 MG-UNIT per tablet Take 1 tablet by mouth daily   Yes Historical Provider, MD   Cholecalciferol (VITAMIN D3) 2000 UNITS CAPS Take 1 capsule by mouth daily    Yes Historical Provider, MD   colestipol (COLESTID) 1 G tablet Take 4 g by mouth 2 times daily    Yes Historical Provider, MD   fenofibrate (TRIGLIDE) 160 MG tablet Take 1 tablet by mouth daily  Patient not taking: Reported on 1/6/2022 12/23/21   Melissa Calderón MD       Social History:   reports that she quit smoking about 33 years ago. Her smoking use included cigarettes. She has a 4.00 pack-year smoking history.  She has never used smokeless tobacco. She reports current alcohol use of about 1.0 standard drink of alcohol per week. She reports that she does not use drugs. Family History:  family history includes Cancer (age of onset: 64) in her father; Other (age of onset: 64) in her mother. Reviewed. Denies family history of sudden cardiac death, arrhythmia, premature CAD    Review of System:    · General ROS: negative for - chills, fever   · Psychological ROS: negative for - anxiety or depression  · Ophthalmic ROS: negative for - eye pain or loss of vision  · ENT ROS: negative for - epistaxis, headaches, nasal discharge, sore throat   · Allergy and Immunology ROS: negative for - hives, nasal congestion   · Hematological and Lymphatic ROS: negative for - bleeding problems, blood clots, bruising or jaundice  · Endocrine ROS: negative for - skin changes, temperature intolerance or unexpected weight changes  · Respiratory ROS: negative for - cough, hemoptysis, pleuritic pain, SOB, sputum changes or wheezing  · Cardiovascular ROS: Per HPI. · Gastrointestinal ROS: negative for - abdominal pain, blood in stools, diarrhea, hematemesis, melena, nausea/vomiting or swallowing difficulty/pain  · Genito-Urinary ROS: negative for - dysuria or incontinence  · Musculoskeletal ROS: negative for - joint swelling or muscle pain  · Neurological ROS: negative for - confusion, dizziness, gait disturbance, headaches, numbness/tingling, seizures, speech problems, tremors, visual changes or weakness  · Dermatological ROS: negative for - rash    Physical Examination:  Vitals:    01/25/22 1144   BP: 120/80   Pulse: 129       · Constitutional: Oriented. No distress. · Head: Normocephalic and atraumatic. · Mouth/Throat: Oropharynx is clear and moist.   · Eyes: Conjunctivae normal. EOM are normal.   · Neck: Normal range of motion. Neck supple. No rigidity. No JVD present. · Cardiovascular: Tachycardia, irregular rhythm, S1&S2 and intact distal pulses. · Pulmonary/Chest: Bilateral respiratory sounds. No wheezes. No rhonchi. · Abdominal: Soft. Bowel sounds present. No distension, No tenderness. · Musculoskeletal: No tenderness. No edema    · Lymphadenopathy: Has no cervical adenopathy. · Neurological: Alert and oriented. Cranial nerve appears intact, No Gross deficit   · Skin: Skin is warm and dry. No rash noted. · Psychiatric: Has a normal mood, affect and behavior     Labs:  Reviewed. ECG: reviewed, Atrial Flutter, with QRS duration 100 ms. No pathologic Q waves, ventricular pre-excitation, or QT prolongation. Studies:     1. 2 Week CAM, 1/12 -1/26/2022      2. Event monitor:   Holter Monitor, 8/18 - 8/20/2015  NSR, occasional PACs, < 1% SVT      3. Echo: 6/8/2021  EF 50-55%      The MCOT, echocardiogram, stress test, and coronary angiography/PCI were reviewed by myself and used for my plan of care. Discussed in detail about the risk factors, pathophysiology, and treatment options including life stye modifications for atrial fibrillation. 1. Keep your blood pressure under control. 2. Keep your blood sugar under control. 3. Eat heart healthy diet  4. Minimize alcohol intake  5. Stop smoking  6. Be active, keep your body weight optimal  7. Exercise on a regular basis  8. Manage your stress   9. If you snore, get evaluated for sleep apnea  10. Be aware of the symptoms of stroke    Treatment options including cardioversion, anti-arrhythmic medication therapy, rate control strategy, oral anticoagulation, and atrial fibrillation ablation were discussed with patient. Risks, benefits and alternative of each treatment options were explained. All questions answered. Thank you for allowing me to participate in the care of Ev Mari. All questions and concerns were addressed to the patient/family. Alternatives to my treatment were discussed.      I, Cesario Vásquez RN, am scribing for and in the presence of Dr. Ying Rowell Justus. 01/25/22 12:04 PM  Sally Balderas RN    I, Lieutenant Kahlil MOYA, personally performed the services prescribed in this documentation as scribed in my presence and it is both accurate and complete.      Lieutenant Kahlil MOYA  Cardiac Electrophysiology  [address]

## 2022-01-21 NOTE — TELEPHONE ENCOUNTER
Noted. No change in medications at this time. Pt to follow up with Dr. Cornel Stokes next week in office. LM with pt, advised of above.

## 2022-01-21 NOTE — TELEPHONE ENCOUNTER
Patient called with BP readings for a week.      1/13/22 127/90 pulse 127    1/14/22 88/64 pulse 68    1/15/22 118/90 pulse 90    1/16/22 127/82 pulse 127    1/17/22 111/90 pulse 46    1/18/22 121/75 pulse 127    1/19/22 124/86 pulse 83    1/20/22 127/87 pulse 127    1/21/22 122/78 pulse 78

## 2022-01-25 ENCOUNTER — OFFICE VISIT (OUTPATIENT)
Dept: CARDIOLOGY CLINIC | Age: 77
End: 2022-01-25
Payer: MEDICARE

## 2022-01-25 VITALS
WEIGHT: 184 LBS | DIASTOLIC BLOOD PRESSURE: 80 MMHG | HEART RATE: 129 BPM | HEIGHT: 65 IN | BODY MASS INDEX: 30.66 KG/M2 | SYSTOLIC BLOOD PRESSURE: 120 MMHG

## 2022-01-25 DIAGNOSIS — I48.92 PAROXYSMAL ATRIAL FLUTTER (HCC): Primary | ICD-10-CM

## 2022-01-25 DIAGNOSIS — I10 BENIGN ESSENTIAL HTN: ICD-10-CM

## 2022-01-25 DIAGNOSIS — R06.09 DOE (DYSPNEA ON EXERTION): ICD-10-CM

## 2022-01-25 DIAGNOSIS — R00.2 PALPITATIONS: ICD-10-CM

## 2022-01-25 DIAGNOSIS — E03.9 ACQUIRED HYPOTHYROIDISM: ICD-10-CM

## 2022-01-25 DIAGNOSIS — I48.0 PAROXYSMAL ATRIAL FIBRILLATION (HCC): ICD-10-CM

## 2022-01-25 DIAGNOSIS — E78.2 MIXED HYPERLIPIDEMIA: ICD-10-CM

## 2022-01-25 PROCEDURE — 99205 OFFICE O/P NEW HI 60 MIN: CPT | Performed by: INTERNAL MEDICINE

## 2022-01-25 PROCEDURE — 1090F PRES/ABSN URINE INCON ASSESS: CPT | Performed by: INTERNAL MEDICINE

## 2022-01-25 PROCEDURE — G8484 FLU IMMUNIZE NO ADMIN: HCPCS | Performed by: INTERNAL MEDICINE

## 2022-01-25 PROCEDURE — 4040F PNEUMOC VAC/ADMIN/RCVD: CPT | Performed by: INTERNAL MEDICINE

## 2022-01-25 PROCEDURE — G8427 DOCREV CUR MEDS BY ELIG CLIN: HCPCS | Performed by: INTERNAL MEDICINE

## 2022-01-25 PROCEDURE — 3017F COLORECTAL CA SCREEN DOC REV: CPT | Performed by: INTERNAL MEDICINE

## 2022-01-25 PROCEDURE — 1036F TOBACCO NON-USER: CPT | Performed by: INTERNAL MEDICINE

## 2022-01-25 PROCEDURE — 1123F ACP DISCUSS/DSCN MKR DOCD: CPT | Performed by: INTERNAL MEDICINE

## 2022-01-25 PROCEDURE — G8399 PT W/DXA RESULTS DOCUMENT: HCPCS | Performed by: INTERNAL MEDICINE

## 2022-01-25 PROCEDURE — 93000 ELECTROCARDIOGRAM COMPLETE: CPT | Performed by: INTERNAL MEDICINE

## 2022-01-25 PROCEDURE — G8417 CALC BMI ABV UP PARAM F/U: HCPCS | Performed by: INTERNAL MEDICINE

## 2022-01-25 NOTE — PATIENT INSTRUCTIONS
Electrophysiology Study (EPS) and Catheter Ablation      Date of Procedure: January 28, 2022    Time of Arrival: 9:30 AM    Cardiologist performing procedure: Dr Celestina Holstein      · [facility]. Check in at the Outpatient Diagnostic desk on the 1st floor. · Do not eat or drink anything after midnight the night before the procedure. · You may brush your teeth and rinse the morning of the procedure. · You must get tested for COVID no more than 3 days prior to the procedure. You must bring documentation of the results to the hospital the day of the procedure. · Take ALL of your routine medications the morning of the procedure. However, if you are taking diabetic medications, please HOLD on the day of the procedure (including insulin). If you take Lantus insulin, take HALF of your usual dose the night before. · Do NOT stop taking Eliquis (any blood thinners). Do not take any blood thinners the morning of the procedure. · You must get tested for COVID no more than 3 days prior to the procedure. You must bring documentation of the results to the hospital the day of the procedure. ·     · Lab work is due today. You do not need to be fasting for these labs. · Do not apply any lotion, powder, or deodorant the morning of the procedure. · Please bring a list of your medications to the hospital with you. · You must have someone available to drive you home. It is recommended that you do not drive for 24 hours after the procedure. · You will need someone to stay with you at home the night of the procedure. · If you are unable to make this appointment, please call [facility] Cardiology at [phone].   ·

## 2022-01-25 NOTE — LETTER
[facility]  EP Procedure Sheet  1/25/22    Isabella Diamond  1945    Physician: Dr Fallon Jay    EP Procedures   Pacemaker implant x EP Study    ICD implant x Atrial flutter ablation     Biv implant  Atrial fibrillation ablation    Generator Change  SVT ablation    Lead revision  VT ablation    Lead extraction +/- upgrade  AVN ablation    Loop implant  Cardioversion     Other:   ADRIANA     Equipment   [employer]   JENNIFER [facility] [employer]  CryoAblation    Biotronik  Laser Lead Extraction    Special Equipment       EP Procedures Scheduling Request  Time Requested     Specific Day    Anesthesia x   CT surgery backup    Location [facility]     Pre-Procedure Labs / Imaging   PT/INR  Type & cross    CBC  Units PRBC    BMP/Mg  Units FFP    Venogram  CXR    Echo  Pulmonary CTA for Pulmonary vein mapping

## 2022-01-27 PROCEDURE — 93248 EXT ECG>7D<15D REV&INTERPJ: CPT | Performed by: INTERNAL MEDICINE

## 2022-01-28 ENCOUNTER — TELEPHONE (OUTPATIENT)
Dept: CARDIOLOGY CLINIC | Age: 77
End: 2022-01-28

## 2022-01-28 DIAGNOSIS — I48.91 ATRIAL FIBRILLATION, UNSPECIFIED TYPE (HCC): ICD-10-CM

## 2022-01-28 NOTE — TELEPHONE ENCOUNTER
Spoke w/pt at length; advised CAM report  100 % AFL. Advised pt to call back to schedule appt w/dr delatorre if she decides to move forward with further treatment. States will see PCP next week to discuss.

## 2022-02-01 ENCOUNTER — OFFICE VISIT (OUTPATIENT)
Dept: PRIMARY CARE CLINIC | Age: 77
End: 2022-02-01
Payer: MEDICARE

## 2022-02-01 DIAGNOSIS — F31.9 BIPOLAR 1 DISORDER (HCC): ICD-10-CM

## 2022-02-01 DIAGNOSIS — E78.2 MIXED HYPERLIPIDEMIA: Primary | ICD-10-CM

## 2022-02-01 DIAGNOSIS — E03.9 ACQUIRED HYPOTHYROIDISM: ICD-10-CM

## 2022-02-01 PROCEDURE — 3017F COLORECTAL CA SCREEN DOC REV: CPT | Performed by: FAMILY MEDICINE

## 2022-02-01 PROCEDURE — 4040F PNEUMOC VAC/ADMIN/RCVD: CPT | Performed by: FAMILY MEDICINE

## 2022-02-01 PROCEDURE — G8484 FLU IMMUNIZE NO ADMIN: HCPCS | Performed by: FAMILY MEDICINE

## 2022-02-01 PROCEDURE — 99214 OFFICE O/P EST MOD 30 MIN: CPT | Performed by: FAMILY MEDICINE

## 2022-02-01 PROCEDURE — 1123F ACP DISCUSS/DSCN MKR DOCD: CPT | Performed by: FAMILY MEDICINE

## 2022-02-01 PROCEDURE — 1090F PRES/ABSN URINE INCON ASSESS: CPT | Performed by: FAMILY MEDICINE

## 2022-02-01 PROCEDURE — G8427 DOCREV CUR MEDS BY ELIG CLIN: HCPCS | Performed by: FAMILY MEDICINE

## 2022-02-01 PROCEDURE — 1036F TOBACCO NON-USER: CPT | Performed by: FAMILY MEDICINE

## 2022-02-01 PROCEDURE — G8399 PT W/DXA RESULTS DOCUMENT: HCPCS | Performed by: FAMILY MEDICINE

## 2022-02-01 PROCEDURE — G8417 CALC BMI ABV UP PARAM F/U: HCPCS | Performed by: FAMILY MEDICINE

## 2022-02-01 RX ORDER — SPIRONOLACTONE 25 MG/1
TABLET ORAL
COMMUNITY
Start: 2022-01-28 | End: 2022-05-09

## 2022-02-01 ASSESSMENT — PATIENT HEALTH QUESTIONNAIRE - PHQ9
7. TROUBLE CONCENTRATING ON THINGS, SUCH AS READING THE NEWSPAPER OR WATCHING TELEVISION: 0
SUM OF ALL RESPONSES TO PHQ QUESTIONS 1-9: 0
SUM OF ALL RESPONSES TO PHQ QUESTIONS 1-9: 0
2. FEELING DOWN, DEPRESSED OR HOPELESS: 0
4. FEELING TIRED OR HAVING LITTLE ENERGY: 0
5. POOR APPETITE OR OVEREATING: 0
8. MOVING OR SPEAKING SO SLOWLY THAT OTHER PEOPLE COULD HAVE NOTICED. OR THE OPPOSITE, BEING SO FIGETY OR RESTLESS THAT YOU HAVE BEEN MOVING AROUND A LOT MORE THAN USUAL: 0
10. IF YOU CHECKED OFF ANY PROBLEMS, HOW DIFFICULT HAVE THESE PROBLEMS MADE IT FOR YOU TO DO YOUR WORK, TAKE CARE OF THINGS AT HOME, OR GET ALONG WITH OTHER PEOPLE: 0
SUM OF ALL RESPONSES TO PHQ QUESTIONS 1-9: 0
6. FEELING BAD ABOUT YOURSELF - OR THAT YOU ARE A FAILURE OR HAVE LET YOURSELF OR YOUR FAMILY DOWN: 0
3. TROUBLE FALLING OR STAYING ASLEEP: 0
SUM OF ALL RESPONSES TO PHQ QUESTIONS 1-9: 0
1. LITTLE INTEREST OR PLEASURE IN DOING THINGS: 0
9. THOUGHTS THAT YOU WOULD BE BETTER OFF DEAD, OR OF HURTING YOURSELF: 0
SUM OF ALL RESPONSES TO PHQ9 QUESTIONS 1 & 2: 0

## 2022-02-01 NOTE — PROGRESS NOTES
PROGRESS NOTE  Date of Service:  2/1/2022    SUBJECTIVE:  Patient ID: Cayetano Price is a 77 y.o. female    HPI:   She has had resolution of infection under breast with medication use    Hypothyroidism-on thyroid replacement. Well replaced 12/21    Secondary hypertension-follows with nephrology. Medications adjusted for low blood pressure and hyperkalemia    Hyperkalemia-she is closely following a low potassium diet but does find this difficult to do.follows with nephrology regularly    Hyperlipidemia- continues atorvastatin; but felt she had nausea with fenofibrate and discontinued use. Nausea did not resolve with discontinued of med but did over the next several weeks  No emesis, no black stool, no BRPPR, no abdominal pain  She had stopped her PPI previously      6/21diagnosed with atrial fibrillaton/flutter- follows with cardiology; medications being adjusted due to continued elevated HR   on eliquis    Allergic rhinitis-occasional exac, using zyrtec occasinally    Generalized osteoarthritis-follows with rheumatology. She is using diclofenac gel and Tylenol as needed. Considering Cymbalta in the future    Osteopenia- dexa completed 11/21 improved BMD by 15 % from previous    Bipolar disorder-managed by psychiatry. on zoloft. Denies symptoms at this time  Insomnia- on temazepam through psychiatry    Patient's medications, allergies, past medical, surgical, social and family histories were reviewed and updated as appropriate. OBJECTIVE:  Vitals:    02/01/22 1100 02/01/22 1124   BP: (!) 142/68 138/68   Site: Right Upper Arm    Position: Sitting    Cuff Size: Large Adult    Pulse: 52    Resp: 18    Temp: 95.6 °F (35.3 °C)    TempSrc: Temporal    SpO2: 97%    Weight: 186 lb (84.4 kg)    Height: 5' 5\" (1.651 m)       Body mass index is 30.95 kg/m². Physical Exam  Constitutional:       Appearance: Normal appearance. HENT:      Head: Normocephalic and atraumatic.    Cardiovascular:      Rate and Rhythm: Normal rate. Rhythm irregular. Abdominal:      Palpations: Abdomen is soft. Tenderness: There is no abdominal tenderness. There is no guarding. Skin:     Comments: No erythema, excoriation at breast   Neurological:      General: No focal deficit present. Mental Status: She is alert and oriented to person, place, and time. Psychiatric:         Attention and Perception: Attention and perception normal.         Mood and Affect: Mood and affect normal.         Speech: Speech normal.         Behavior: Behavior normal. Behavior is cooperative. Thought Content: Thought content normal.         Cognition and Memory: Cognition and memory normal.         Judgment: Judgment normal.         ASSESSMENT:  1. Mixed hyperlipidemia    2. B[facility] disorder ([facility]    3. Acquired hypothyroidism         PLAN:   1. Mixed hyperlipidemia  Do recommend retry fenofibrate as she did not have resolution of nausea with cessation of med. She will try again and call in 1 weeks if she has been able to continue. Would plan to recheck labs after 6 weeks of med use    2. Bipolar 1 disorder ([facility]  Continue current medications and care per psychiatry    3. hypothyroidism  Well replaced    Return for call in 1 week and update if able to tolerate the fenofibrate. Electronically signed by Tamiko Bower MD on 2/1/2022 at 11:40 AM.    Please note this chart was generated using dragon dictation software. Although every effort was made to ensure the accuracy of this automated transcription, some errors in transcription may have occurred.

## 2022-02-04 VITALS
WEIGHT: 186 LBS | BODY MASS INDEX: 30.99 KG/M2 | TEMPERATURE: 95.6 F | HEART RATE: 52 BPM | HEIGHT: 65 IN | DIASTOLIC BLOOD PRESSURE: 68 MMHG | RESPIRATION RATE: 18 BRPM | SYSTOLIC BLOOD PRESSURE: 138 MMHG | OXYGEN SATURATION: 97 %

## 2022-02-08 ENCOUNTER — TELEPHONE (OUTPATIENT)
Dept: PRIMARY CARE CLINIC | Age: 77
End: 2022-02-08

## 2022-02-08 NOTE — TELEPHONE ENCOUNTER
----- Message from Joyce García MD sent at 2/7/2022 12:02 PM EST -----  Regarding: appt  This patient was scheduled for an appointment on Thursday. She however did not need visit in the office she was disposed to call me with how she is doing with the restart of the fenofibrate. Please cancel her appointment and see how she is doing now.   Thank you

## 2022-02-09 ENCOUNTER — TELEPHONE (OUTPATIENT)
Dept: PRIMARY CARE CLINIC | Age: 77
End: 2022-02-09

## 2022-02-25 RX ORDER — TEMAZEPAM 30 MG/1
30 CAPSULE ORAL NIGHTLY PRN
Qty: 30 CAPSULE | Refills: 0 | OUTPATIENT
Start: 2022-02-25 | End: 2022-03-27

## 2022-02-25 NOTE — TELEPHONE ENCOUNTER
Medication:   Requested Prescriptions     Pending Prescriptions Disp Refills    temazepam (RESTORIL) 30 MG capsule       Sig: Take 1 capsule by mouth nightly as needed for Sleep. Last Filled:  Historical listing    Patient Phone Number: [phone] (home)     Last appt: 2/1/2022   Next appt: Visit date not found    Last OARRS:   RX Monitoring 6/22/2017   Attestation The Prescription Monitoring Report for this patient was reviewed today.

## 2022-02-25 NOTE — TELEPHONE ENCOUNTER
Pt calling in about her refill. Physician note read to her. Pt. Says that her psychiatrist has retired. And would like to get her medication refilled.

## 2022-02-28 RX ORDER — PANTOPRAZOLE SODIUM 40 MG/1
40 TABLET, DELAYED RELEASE ORAL DAILY
Qty: 30 TABLET | Refills: 1 | Status: SHIPPED | OUTPATIENT
Start: 2022-02-28

## 2022-02-28 NOTE — TELEPHONE ENCOUNTER
When a physician retires-someone in group takes over their patients.   She should contact the office of the psychiatrist

## 2022-03-01 RX ORDER — TEMAZEPAM 30 MG/1
30 CAPSULE ORAL NIGHTLY PRN
OUTPATIENT
Start: 2022-03-01

## 2022-03-01 NOTE — TELEPHONE ENCOUNTER
Patient is upset- states that there was no other psychiatrist in that office- she was the only one. She hung up on me.

## 2022-03-16 ENCOUNTER — TELEPHONE (OUTPATIENT)
Dept: RHEUMATOLOGY | Age: 77
End: 2022-03-16

## 2022-03-17 NOTE — TELEPHONE ENCOUNTER
It looks about the same as old DEXA scan years ago- Osteopenia. No need for meds. Please stay on ca and vit D supplement.

## 2022-04-01 RX ORDER — FENOFIBRATE 160 MG/1
TABLET ORAL
Qty: 30 TABLET | Refills: 2 | OUTPATIENT
Start: 2022-04-01

## 2022-04-01 NOTE — TELEPHONE ENCOUNTER
Medication:   Requested Prescriptions     Pending Prescriptions Disp Refills    fenofibrate (TRIGLIDE) 160 MG tablet [Pharmacy Med Name: FENOFIBRATE 160 MG TABLET] 30 tablet 2     Sig: TAKE ONE TABLET BY MOUTH DAILY        Last Filled:      Patient Phone Number: [phone] (home)     Last appt: 2/1/2022   Next appt: Visit date not found    Last OARRS:   RX Monitoring 6/22/2017   Attestation The Prescription Monitoring Report for this patient was reviewed today.

## 2022-04-04 ENCOUNTER — TELEMEDICINE (OUTPATIENT)
Dept: RHEUMATOLOGY | Age: 77
End: 2022-04-04
Payer: MEDICARE

## 2022-04-04 DIAGNOSIS — M85.851 OSTEOPENIA OF NECK OF RIGHT FEMUR: ICD-10-CM

## 2022-04-04 DIAGNOSIS — M15.9 GENERALIZED OSTEOARTHRITIS: Primary | ICD-10-CM

## 2022-04-04 PROCEDURE — 4040F PNEUMOC VAC/ADMIN/RCVD: CPT | Performed by: INTERNAL MEDICINE

## 2022-04-04 PROCEDURE — 1123F ACP DISCUSS/DSCN MKR DOCD: CPT | Performed by: INTERNAL MEDICINE

## 2022-04-04 PROCEDURE — G8399 PT W/DXA RESULTS DOCUMENT: HCPCS | Performed by: INTERNAL MEDICINE

## 2022-04-04 PROCEDURE — 99213 OFFICE O/P EST LOW 20 MIN: CPT | Performed by: INTERNAL MEDICINE

## 2022-04-04 PROCEDURE — G8427 DOCREV CUR MEDS BY ELIG CLIN: HCPCS | Performed by: INTERNAL MEDICINE

## 2022-04-04 PROCEDURE — 1090F PRES/ABSN URINE INCON ASSESS: CPT | Performed by: INTERNAL MEDICINE

## 2022-04-04 NOTE — PROGRESS NOTES
[address] Thomas Maria MD                                                                       [facility] Yenni Al[address], [address]                                                (P) [phone] (F)      Note is transcribed using voice recognition software. Inadvertent computerized transcription errors may be present. Patient identification: Aric Montana,: 1945,Sex: female       Subjective: Follow-up for generalized osteoarthritis and osteopenia. She is having profuse diarrhea since this a.m., has been on clindamycin by her dentist in preparation for dental extraction tomorrow. This is second course of antibiotic per patient. She denies any fever, chills. She does have some nausea. Remained stable in terms of arthritis, utilizes diclofenac gel as needed and takes acetaminophen. DEXA scan revealed osteopenia. No osteoporotic fractures. All other review of systems are negative. OBJECTIVE  Physical    There were no vitals filed for this visit. General appearance/psychiatric: AAAx3, well nourished and well groomed. MKS: No findings to be appreciated in [facility]. Skin: No rashes or indurations. ASSESSMENT/PLAN:     Diagnosis Orders   1. Generalized osteoarthritis     2. Osteopenia of neck of right femur          1. Generalized osteoarthritis-  Stable, continue diclofenac gel and oral acetaminophen as needed. 2.  Bone health-  DEXA scan   2021 T score L spine 1.7, R hip -1.5, R FN -2.1    Recommend calcium and vitamin D maintenance. Dosing discussed-100 mg of calcium twice daily and 2000 IU daily vitamin D.    3.  Diarrhea-likely from clindamycin. Advised patient to call dentist, might need different antibiotics.   If diarrhea is persistent, advised patient to make appointment with primary provider to rule out C. difficile. Total time 25 minutes that includes the following-  Preparing to see the patient such as reviewing patients records, pre-charting, preparing the visit on the same day, performing a medically appropriate history and physical examination, counseling and educating patient about diagnosis, management plan, ordering appropriate testings, prescriptions, communicating findings to other care providers, and documenting clinical information and electronic medical record. Qianaflorin Salgado, was evaluated through a synchronous (real-time) audio-video encounter. The patient (or guardian if applicable) is aware that this is a billable service, which includes applicable co-pays. This Virtual Visit was conducted with patient's (and/or legal guardian's) consent. The visit was conducted pursuant to the emergency declaration under the [address] [place] [address] waiver authority and the [employer] General Act. Patient identification was verified, and a caregiver was present when appropriate. The patient was located at home in a state where the provider was licensed to provide care.

## 2022-04-28 DIAGNOSIS — E03.9 ACQUIRED HYPOTHYROIDISM: ICD-10-CM

## 2022-04-28 RX ORDER — LEVOTHYROXINE SODIUM 0.05 MG/1
TABLET ORAL
Qty: 90 TABLET | Refills: 0 | OUTPATIENT
Start: 2022-04-28

## 2022-04-28 NOTE — TELEPHONE ENCOUNTER
Medication:   Requested Prescriptions     Pending Prescriptions Disp Refills    levothyroxine (SYNTHROID) 50 MCG tablet 90 tablet 0        Last Filled: 01/20/2022 # 90     Patient Phone Number: [phone] (home)     Last appt: 2/1/2022   Next appt: Visit date not found    Last OARRS:   RX Monitoring 6/22/2017   Attestation The Prescription Monitoring Report for this patient was reviewed today.

## 2022-05-06 DIAGNOSIS — N18.30 STAGE 3 CHRONIC KIDNEY DISEASE, UNSPECIFIED WHETHER STAGE 3A OR 3B CKD (HCC): ICD-10-CM

## 2022-05-06 DIAGNOSIS — I15.8 OTHER SECONDARY HYPERTENSION: ICD-10-CM

## 2022-05-07 LAB
ALBUMIN SERPL-MCNC: 4.3 G/DL (ref 3.4–5)
ANION GAP SERPL CALCULATED.3IONS-SCNC: 15 MMOL/L (ref 3–16)
BUN BLDV-MCNC: 15 MG/DL (ref 7–20)
CALCIUM SERPL-MCNC: 9.1 MG/DL (ref 8.3–10.6)
CHLORIDE BLD-SCNC: 109 MMOL/L (ref 99–110)
CO2: 20 MMOL/L (ref 21–32)
CREAT SERPL-MCNC: 1 MG/DL (ref 0.6–1.2)
GFR AFRICAN AMERICAN: >60
GFR NON-AFRICAN AMERICAN: 54
GLUCOSE BLD-MCNC: 124 MG/DL (ref 70–99)
PHOSPHORUS: 3.2 MG/DL (ref 2.5–4.9)
POTASSIUM SERPL-SCNC: 4.1 MMOL/L (ref 3.5–5.1)
SODIUM BLD-SCNC: 144 MMOL/L (ref 136–145)

## 2022-06-01 ENCOUNTER — TELEPHONE (OUTPATIENT)
Dept: PRIMARY CARE CLINIC | Age: 77
End: 2022-06-01

## 2022-06-01 NOTE — TELEPHONE ENCOUNTER
----- Message from Ellen Ennis sent at 5/31/2022  1:19 PM EDT -----  Subject: Message to Provider    QUESTIONS  Information for Provider? Patient would like pcp to submit a script per   patient's surgeon for Tepeazpam 30mg patient says it helps with sleeping   at night. please contact patient either way, and to [facility] on [address].   ---------------------------------------------------------------------------  --------------  [employer]  What is the best way for the office to contact you? OK to leave message on   voicemail  Preferred Call Back Phone Number? [phone]  ---------------------------------------------------------------------------  --------------  SCRIPT ANSWERS  Relationship to Patient?  Self

## 2022-06-09 ENCOUNTER — OFFICE VISIT (OUTPATIENT)
Dept: PRIMARY CARE CLINIC | Age: 77
End: 2022-06-09
Payer: MEDICARE

## 2022-06-09 VITALS
HEIGHT: 65 IN | SYSTOLIC BLOOD PRESSURE: 124 MMHG | RESPIRATION RATE: 16 BRPM | HEART RATE: 68 BPM | TEMPERATURE: 96.7 F | DIASTOLIC BLOOD PRESSURE: 68 MMHG | WEIGHT: 183.4 LBS | BODY MASS INDEX: 30.56 KG/M2 | OXYGEN SATURATION: 96 %

## 2022-06-09 DIAGNOSIS — F51.01 PRIMARY INSOMNIA: ICD-10-CM

## 2022-06-09 DIAGNOSIS — E03.9 ACQUIRED HYPOTHYROIDISM: ICD-10-CM

## 2022-06-09 DIAGNOSIS — E78.2 MIXED HYPERLIPIDEMIA: ICD-10-CM

## 2022-06-09 DIAGNOSIS — N18.32 STAGE 3B CHRONIC KIDNEY DISEASE (HCC): ICD-10-CM

## 2022-06-09 DIAGNOSIS — K56.691 OTHER COMPLETE INTESTINAL OBSTRUCTION (HCC): Primary | ICD-10-CM

## 2022-06-09 PROCEDURE — 1090F PRES/ABSN URINE INCON ASSESS: CPT | Performed by: FAMILY MEDICINE

## 2022-06-09 PROCEDURE — 99214 OFFICE O/P EST MOD 30 MIN: CPT | Performed by: FAMILY MEDICINE

## 2022-06-09 PROCEDURE — 1123F ACP DISCUSS/DSCN MKR DOCD: CPT | Performed by: FAMILY MEDICINE

## 2022-06-09 PROCEDURE — G8399 PT W/DXA RESULTS DOCUMENT: HCPCS | Performed by: FAMILY MEDICINE

## 2022-06-09 PROCEDURE — G8417 CALC BMI ABV UP PARAM F/U: HCPCS | Performed by: FAMILY MEDICINE

## 2022-06-09 PROCEDURE — G8427 DOCREV CUR MEDS BY ELIG CLIN: HCPCS | Performed by: FAMILY MEDICINE

## 2022-06-09 PROCEDURE — 1036F TOBACCO NON-USER: CPT | Performed by: FAMILY MEDICINE

## 2022-06-09 RX ORDER — HYDROCODONE BITARTRATE AND ACETAMINOPHEN 5; 325 MG/1; MG/1
TABLET ORAL
COMMUNITY
Start: 2022-05-23 | End: 2022-09-19

## 2022-06-09 RX ORDER — TEMAZEPAM 30 MG/1
30 CAPSULE ORAL NIGHTLY PRN
Qty: 90 CAPSULE | Refills: 0 | Status: SHIPPED | OUTPATIENT
Start: 2022-06-09 | End: 2022-09-07

## 2022-06-09 RX ORDER — CHOLECALCIFEROL (VITAMIN D3) 125 MCG
CAPSULE ORAL
COMMUNITY

## 2022-06-09 ASSESSMENT — PATIENT HEALTH QUESTIONNAIRE - PHQ9
SUM OF ALL RESPONSES TO PHQ QUESTIONS 1-9: 0
3. TROUBLE FALLING OR STAYING ASLEEP: 0
SUM OF ALL RESPONSES TO PHQ QUESTIONS 1-9: 0
SUM OF ALL RESPONSES TO PHQ QUESTIONS 1-9: 0
6. FEELING BAD ABOUT YOURSELF - OR THAT YOU ARE A FAILURE OR HAVE LET YOURSELF OR YOUR FAMILY DOWN: 0
10. IF YOU CHECKED OFF ANY PROBLEMS, HOW DIFFICULT HAVE THESE PROBLEMS MADE IT FOR YOU TO DO YOUR WORK, TAKE CARE OF THINGS AT HOME, OR GET ALONG WITH OTHER PEOPLE: 0
5. POOR APPETITE OR OVEREATING: 0
1. LITTLE INTEREST OR PLEASURE IN DOING THINGS: 0
9. THOUGHTS THAT YOU WOULD BE BETTER OFF DEAD, OR OF HURTING YOURSELF: 0
SUM OF ALL RESPONSES TO PHQ QUESTIONS 1-9: 0
8. MOVING OR SPEAKING SO SLOWLY THAT OTHER PEOPLE COULD HAVE NOTICED. OR THE OPPOSITE, BEING SO FIGETY OR RESTLESS THAT YOU HAVE BEEN MOVING AROUND A LOT MORE THAN USUAL: 0
SUM OF ALL RESPONSES TO PHQ9 QUESTIONS 1 & 2: 0
7. TROUBLE CONCENTRATING ON THINGS, SUCH AS READING THE NEWSPAPER OR WATCHING TELEVISION: 0
2. FEELING DOWN, DEPRESSED OR HOPELESS: 0
4. FEELING TIRED OR HAVING LITTLE ENERGY: 0

## 2022-06-09 NOTE — PROGRESS NOTES
PROGRESS NOTE  Date of Service:  6/9/2022    SUBJECTIVE:  Patient ID: Cynthia Chung is a 69 y.o. female    HPI:     Had onset of abdominal pain and bloating   went to [facility] and was found to have  2 hernias and bowel obstruction  Admitted to hospital and underwent surgical correction  Hospitalized for 8 days and discharged to home  She is increasing activity  Diet is advancing and she feels better than she has in a log while      Hypothyroidism-on thyroid replacement. Well replaced 12/21    Secondary hypertension-follows with nephrology. Medications adjusted for low blood pressure and hyperkalemia    Hyperkalemia-she is closely following a low potassium diet but does find this difficult to do.follows with nephrology regularly    Hyperlipidemia- continues atorvastatin; but felt she had nausea with fenofibrate and discontinued use. Nausea did not resolve with discontinued of med but did over the next several weeks  No emesis, no black stool, no BRPPR, no abdominal pain  She had stopped her PPI previously      6/21diagnosed with atrial fibrillaton/flutter- follows with cardiology; medications being adjusted due to continued elevated HR   on eliquis    Allergic rhinitis-occasional exac, using zyrtec occasinally    Generalized osteoarthritis-follows with rheumatology. She is using diclofenac gel and Tylenol as needed. Considering Cymbalta in the future    Osteopenia- dexa completed 11/21 improved BMD by 15 % from previous    Bipolar disorder-previously managed by psychiatry. on zoloft. Denies symptoms at this time. Psychiatrist left and they do not take her insurance  Insomnia- on temazepam for many years , tried many other medications in the past without improvement in symptoms      Patient's medications, allergies, past medical, surgical, social and family histories were reviewed and updated as appropriate.          OBJECTIVE:  Vitals:    06/09/22 1321   BP: 124/68   Site: Right Upper Arm Position: Sitting   Cuff Size: Large Adult   Pulse: 68   Resp: 16   Temp: (!) 96.7 °F (35.9 °C)   TempSrc: Temporal   SpO2: 96%   Weight: 183 lb 6.4 oz (83.2 kg)   Height: 5' 5\" (1.651 m)      Body mass index is 30.52 kg/m². Physical Exam  Constitutional:       Appearance: Normal appearance. HENT:      Head: Normocephalic and atraumatic. Eyes:      General: No scleral icterus. Conjunctiva/sclera: Conjunctivae normal.   Cardiovascular:      Rate and Rhythm: Normal rate and regular rhythm. Heart sounds: Normal heart sounds. Pulmonary:      Breath sounds: Normal breath sounds. No wheezing. Abdominal:      Palpations: Abdomen is soft. Tenderness: There is no abdominal tenderness. Comments: Well healed surgical scar, c/d/i   Neurological:      General: No focal deficit present. Mental Status: She is alert and oriented to person, place, and time. Psychiatric:         Attention and Perception: Attention and perception normal.         Mood and Affect: Mood and affect normal.         Speech: Speech normal.         Behavior: Behavior normal. Behavior is cooperative. Thought Content: Thought content normal.         Cognition and Memory: Cognition and memory normal.         Judgment: Judgment normal.         ASSESSMENT:  1. Other complete intestinal obstruction ([address]    2. Acquired hypothyroidism    3. Mixed hyperlipidemia    4. Primary insomnia    5. Stage 3b chronic kidney disease ([address]         PLAN:   1. Other complete intestinal obstruction ([address]  2. Acquired hypothyroidism  Assessment & Plan:   Assess labs and adjust dose as needed    Orders:  -     TSH with Reflex; Future  3. Mixed hyperlipidemia  Assessment & Plan:   Assess labs and adjust dose as needed    Orders:  -     Lipid, Fasting; Future  4. Primary insomnia  -     temazepam (RESTORIL) 30 MG capsule; Take 1 capsule by mouth nightly as needed for Sleep for up to 90 days. , Disp-90 capsule, R-0Normal  5.  Stage 3b chronic kidney disease ([facility]  Comments:  contnue care per nephrology     S/p surgical resection/ hernia repair. Recovering well/  Reviewed oars. Discussed process of controlled medication refills. Must be in office q 3 months. No refills via phone. Contract signed  Return in about 3 months (around 9/9/2022) for medicare annual wellness and follow. Electronically signed by Chaka Rivera MD on 6/9/2022 at 4:06 PM.    Please note this chart was generated using dragon dictation software. Although every effort was made to ensure the accuracy of this automated transcription, some errors in transcription may have occurred.

## 2022-06-12 PROBLEM — F51.01 PRIMARY INSOMNIA: Status: ACTIVE | Noted: 2022-06-12

## 2022-07-07 RX ORDER — DIGOXIN 125 MCG
TABLET ORAL
Qty: 30 TABLET | Refills: 5 | OUTPATIENT
Start: 2022-07-07

## 2022-07-07 RX ORDER — DIGOXIN 125 MCG
125 TABLET ORAL DAILY
Qty: 30 TABLET | Refills: 5 | OUTPATIENT
Start: 2022-07-07

## 2022-07-08 ENCOUNTER — TELEPHONE (OUTPATIENT)
Dept: CARDIOLOGY CLINIC | Age: 77
End: 2022-07-08

## 2022-07-08 NOTE — TELEPHONE ENCOUNTER
There is no documentation as to why medication was discontinued. Lets have patient wear a CAM for 4 days to assess HR trends to see if she truly needs digoxin as she is also on diltiazem for rate control.

## 2022-07-08 NOTE — TELEPHONE ENCOUNTER
Pt states took last dose lanoxin 125 mcg today, needs refill - was dc'd by Dr Savage Alexis PCP  Cristin Anderson. Please advise if ok to refill.  Thank you

## 2022-07-08 NOTE — TELEPHONE ENCOUNTER
Spoke with pt, pt declined CAM; will call office back if she decides to proceed. Aware that digoxin was dc'd. Will call with concerning symptoms.

## 2022-07-21 RX ORDER — METOPROLOL SUCCINATE 50 MG/1
TABLET, EXTENDED RELEASE ORAL
Qty: 30 TABLET | Refills: 5 | OUTPATIENT
Start: 2022-07-21

## 2022-07-21 NOTE — TELEPHONE ENCOUNTER
Requested Prescriptions     Pending Prescriptions Disp Refills    metoprolol succinate (TOPROL XL) 50 MG extended release tablet [Pharmacy Med Name: METOPROLOL SUCC ER 50 MG TAB] 30 tablet 5     Sig: TAKE ONE TABLET BY MOUTH DAILY          Number: 30    Refills: 5    Last Office Visit: 1/25/2022     Next Office Visit: Visit date not found

## 2022-08-08 LAB
CHOLESTEROL, TOTAL: 113 MG/DL
CHOLESTEROL/HDL RATIO: NORMAL
HDLC SERPL-MCNC: 46 MG/DL (ref 35–70)
LDL CHOLESTEROL CALCULATED: 18 MG/DL (ref 0–160)
NONHDLC SERPL-MCNC: NORMAL MG/DL
TRIGL SERPL-MCNC: 244 MG/DL
VLDLC SERPL CALC-MCNC: NORMAL MG/DL

## 2022-08-09 DIAGNOSIS — E78.2 MIXED HYPERLIPIDEMIA: ICD-10-CM

## 2022-08-10 RX ORDER — ATORVASTATIN CALCIUM 20 MG/1
TABLET, FILM COATED ORAL
Qty: 90 TABLET | Refills: 0 | Status: SHIPPED | OUTPATIENT
Start: 2022-08-10 | End: 2022-09-19 | Stop reason: SDUPTHER

## 2022-08-16 DIAGNOSIS — E03.9 ACQUIRED HYPOTHYROIDISM: ICD-10-CM

## 2022-08-16 RX ORDER — LEVOTHYROXINE SODIUM 0.07 MG/1
75 TABLET ORAL DAILY
Qty: 90 TABLET | Refills: 0 | Status: SHIPPED | OUTPATIENT
Start: 2022-08-16 | End: 2022-09-19 | Stop reason: SDUPTHER

## 2022-08-26 RX ORDER — APIXABAN 5 MG/1
5 TABLET, FILM COATED ORAL DAILY
Qty: 60 TABLET | Refills: 3 | Status: CANCELLED | OUTPATIENT
Start: 2022-08-26

## 2022-08-26 NOTE — TELEPHONE ENCOUNTER
Medication:   Requested Prescriptions     Pending Prescriptions Disp Refills    ELIQUIS 5 MG TABS tablet 60 tablet 3     Sig: Take 1 tablet by mouth daily        Last Filled:  [id number]    Patient Phone Number: [phone] (home)     Last appt: 6/9/2022   Next appt: 9/12/2022    Last OARRS:   RX Monitoring 6/22/2017   Attestation The Prescription Monitoring Report for this patient was reviewed today.

## 2022-08-27 NOTE — TELEPHONE ENCOUNTER
Please have her check her bottle for whomever is prescribing this medication. She should contact them for refills. Her pharmacy would also have this information and they can send a refill request to the prescribing physician.

## 2022-08-29 RX ORDER — APIXABAN 5 MG/1
5 TABLET, FILM COATED ORAL 2 TIMES DAILY
Qty: 60 TABLET | Refills: 0 | OUTPATIENT
Start: 2022-08-29

## 2022-08-29 NOTE — TELEPHONE ENCOUNTER
Medication:   Requested Prescriptions     Pending Prescriptions Disp Refills    ELIQUIS 5 MG TABS tablet 60 tablet 0     Sig: Take 1 tablet by mouth 2 times daily        Last Filled:  patient confirmed 5 mg BID, also updated pharmacy    Patient Phone Number: [phone] (home)     Last appt: 6/9/2022   Next appt: 9/12/2022    Last OARRS:   RX Monitoring 6/22/2017   Attestation The Prescription Monitoring Report for this patient was reviewed today.

## 2022-08-30 ENCOUNTER — TELEPHONE (OUTPATIENT)
Dept: CARDIOLOGY CLINIC | Age: 77
End: 2022-08-30

## 2022-08-30 RX ORDER — APIXABAN 5 MG/1
5 TABLET, FILM COATED ORAL DAILY
Qty: 60 TABLET | Refills: 0 | Status: CANCELLED | OUTPATIENT
Start: 2022-08-30

## 2022-08-30 NOTE — TELEPHONE ENCOUNTER
[facility] [place] [address] [phone] - F [phone]    Called pharmacist. She will call [facility] to find out who the prescribing provider is as patient recently transferred all of her prescriptions to them.

## 2022-08-30 NOTE — TELEPHONE ENCOUNTER
Pt calling to get refill on     ELIQUIS 5 MG TABS tablet    Pt is completly out of medication.      Pharmacy: [address]

## 2022-08-30 NOTE — TELEPHONE ENCOUNTER
This is a duplicate message. I have already responded that this patient needs to contact whomever prescribes this likely her cardiologist.  I am not the prescriber of this medication. She needs to contact them so that they can refill it.

## 2022-08-30 NOTE — TELEPHONE ENCOUNTER
Requested Prescriptions     Pending Prescriptions Disp Refills    ELIQUIS 5 MG TABS tablet 180 tablet 3     Sig: Take 1 tablet by mouth daily          Number: 180    Refills: 1    Last Office Visit: 1/25/2022     Next Office Visit: Visit date not found

## 2022-08-30 NOTE — TELEPHONE ENCOUNTER
Medication:   Requested Prescriptions     Pending Prescriptions Disp Refills    ELIQUIS 5 MG TABS tablet 60 tablet 0     Sig: Take 1 tablet by mouth daily        Last Filled:  04/15/2022    Patient Phone Number: [phone] (home)     Last appt: 6/9/2022   Next appt: 9/12/2022    Last OARRS:   RX Monitoring 6/22/2017   Attestation The Prescription Monitoring Report for this patient was reviewed today.

## 2022-09-01 RX ORDER — APIXABAN 5 MG/1
5 TABLET, FILM COATED ORAL DAILY
Qty: 180 TABLET | Refills: 1 | Status: SHIPPED | OUTPATIENT
Start: 2022-09-01

## 2022-09-07 DIAGNOSIS — F51.01 PRIMARY INSOMNIA: ICD-10-CM

## 2022-09-07 RX ORDER — TEMAZEPAM 30 MG/1
30 CAPSULE ORAL NIGHTLY PRN
Qty: 90 CAPSULE | Refills: 0 | OUTPATIENT
Start: 2022-09-07 | End: 2022-12-06

## 2022-09-07 NOTE — TELEPHONE ENCOUNTER
Medication:   Requested Prescriptions     Pending Prescriptions Disp Refills    temazepam (RESTORIL) 30 MG capsule 90 capsule 0     Sig: Take 1 capsule by mouth nightly as needed for Sleep for up to 90 days. Last Filled:  06/09/2022  #90 with no refills     Patient Phone Number: [phone] (home)     Last appt: 6/9/2022   Next appt: 9/12/2022    Last OARRS:   RX Monitoring 6/22/2017   Attestation The Prescription Monitoring Report for this patient was reviewed today.

## 2022-09-07 NOTE — TELEPHONE ENCOUNTER
Patient called requesting a refill of temazepam (RESTORIL) 30 MG capsule [[id number]] to be sent to [facility] pharmacy, however she has zero refills left. I notified her that she will need to make an appointment in order to get her medication refilled. She already has an appointment for her Annual Wellness Check on 9/12/22.

## 2022-09-09 ENCOUNTER — TELEPHONE (OUTPATIENT)
Dept: PRIMARY CARE CLINIC | Age: 77
End: 2022-09-09

## 2022-09-09 NOTE — TELEPHONE ENCOUNTER
Patient called regarding the status of her medication refill. The medication refill was requested on 9/7/22, however it was denied due to patient having no more refills and needing an appointment. She had previously scheduled an appointment for 9/12, and decided to keep that appointment.  The refill request was routed to Dr. Louisa Saleem as it was

## 2022-09-09 NOTE — TELEPHONE ENCOUNTER
Patient called regarding the status of her medication refill. The medication refill was requested on 9/7/22, however it was denied due to patient having no more refills and needing an appointment. She had previously scheduled an appointment for 9/12, and decided to keep that appointment. The refill request was routed to Dr. Grace Lenz as it was her choice if she would refill the Temazepam. Dr. Grace Lenz denied it, so when the patient called I mentioned it was up to Dr. Grace Lenz to decide if it should get it refilled because it is a controlled substance. She then started yelling and shouting profanity about how she wants to cancel her appointment on 9/12. Please advise, she doesn't have an appointment anymore to get this medication refilled.

## 2022-09-13 ENCOUNTER — TELEPHONE (OUTPATIENT)
Dept: PRIMARY CARE CLINIC | Age: 77
End: 2022-09-13

## 2022-09-13 NOTE — LETTER
[facility]  [address], [address]  Phone: [phone]  Fax: [phone]    9/13/2022    Jerry Copper  1945  [address] [place] [id number]      Dear Aimee Parikh,    I regret to inform you that the provider(s) at [facility] will no longer be able to provide your medical care, effective from the date of this letter. I recommend that you immediately find a new provider. We will provide prescriptions for your current medications based on your past refill history to ensure you will have sufficient medications to last 30 days from the date of this letter. To obtain your medical records, please contact our Medical Records department. You may contact your insurance provider, managed care organization (if applicable) or local Medical Society to obtain a current listing of providers available to provide care. I urge you to see a new provider quickly so that there will be no interruption of your care. I wish you the best in your future medical care. Sincerely,        ELIUD Snyder.LOUISE., A.M.T. For: Paige Hsieh MD    Practice Manager,  [address]  P: [phone]  F: [phone]

## 2022-09-13 NOTE — TELEPHONE ENCOUNTER
[phone] ([place])     [facility] for patient to return call. Please direct her call to me when she calls back.

## 2022-09-19 ENCOUNTER — OFFICE VISIT (OUTPATIENT)
Dept: PRIMARY CARE CLINIC | Age: 77
End: 2022-09-19
Payer: MEDICARE

## 2022-09-19 VITALS
RESPIRATION RATE: 18 BRPM | TEMPERATURE: 97.6 F | OXYGEN SATURATION: 97 % | HEIGHT: 65 IN | BODY MASS INDEX: 31.12 KG/M2 | DIASTOLIC BLOOD PRESSURE: 64 MMHG | WEIGHT: 186.8 LBS | SYSTOLIC BLOOD PRESSURE: 108 MMHG | HEART RATE: 61 BPM

## 2022-09-19 DIAGNOSIS — E78.2 MIXED HYPERLIPIDEMIA: ICD-10-CM

## 2022-09-19 DIAGNOSIS — F51.01 PRIMARY INSOMNIA: ICD-10-CM

## 2022-09-19 DIAGNOSIS — E03.9 ACQUIRED HYPOTHYROIDISM: ICD-10-CM

## 2022-09-19 DIAGNOSIS — Z00.00 MEDICARE ANNUAL WELLNESS VISIT, SUBSEQUENT: Primary | ICD-10-CM

## 2022-09-19 DIAGNOSIS — F31.9 BIPOLAR 1 DISORDER (HCC): ICD-10-CM

## 2022-09-19 PROCEDURE — G0439 PPPS, SUBSEQ VISIT: HCPCS | Performed by: FAMILY MEDICINE

## 2022-09-19 PROCEDURE — 1090F PRES/ABSN URINE INCON ASSESS: CPT | Performed by: FAMILY MEDICINE

## 2022-09-19 PROCEDURE — G8427 DOCREV CUR MEDS BY ELIG CLIN: HCPCS | Performed by: FAMILY MEDICINE

## 2022-09-19 PROCEDURE — 1036F TOBACCO NON-USER: CPT | Performed by: FAMILY MEDICINE

## 2022-09-19 PROCEDURE — 1123F ACP DISCUSS/DSCN MKR DOCD: CPT | Performed by: FAMILY MEDICINE

## 2022-09-19 PROCEDURE — G8417 CALC BMI ABV UP PARAM F/U: HCPCS | Performed by: FAMILY MEDICINE

## 2022-09-19 PROCEDURE — G8399 PT W/DXA RESULTS DOCUMENT: HCPCS | Performed by: FAMILY MEDICINE

## 2022-09-19 PROCEDURE — 99213 OFFICE O/P EST LOW 20 MIN: CPT | Performed by: FAMILY MEDICINE

## 2022-09-19 RX ORDER — ATORVASTATIN CALCIUM 20 MG/1
TABLET, FILM COATED ORAL
Qty: 90 TABLET | Refills: 1 | Status: SHIPPED | OUTPATIENT
Start: 2022-09-19

## 2022-09-19 RX ORDER — DIGOXIN 125 MCG
TABLET ORAL
COMMUNITY

## 2022-09-19 RX ORDER — LEVOTHYROXINE SODIUM 0.07 MG/1
75 TABLET ORAL DAILY
Qty: 90 TABLET | Refills: 1 | Status: SHIPPED | OUTPATIENT
Start: 2022-09-19

## 2022-09-19 RX ORDER — TEMAZEPAM 30 MG/1
30 CAPSULE ORAL NIGHTLY PRN
Qty: 90 CAPSULE | Refills: 0 | Status: SHIPPED | OUTPATIENT
Start: 2022-09-19 | End: 2022-12-18

## 2022-09-19 RX ORDER — METOPROLOL SUCCINATE 50 MG/1
TABLET, EXTENDED RELEASE ORAL
COMMUNITY

## 2022-09-19 RX ORDER — CONJUGATED ESTROGENS 0.62 MG/G
CREAM VAGINAL
COMMUNITY
Start: 2022-08-29

## 2022-09-19 SDOH — ECONOMIC STABILITY: FOOD INSECURITY: WITHIN THE PAST 12 MONTHS, THE FOOD YOU BOUGHT JUST DIDN'T LAST AND YOU DIDN'T HAVE MONEY TO GET MORE.: NEVER TRUE

## 2022-09-19 SDOH — ECONOMIC STABILITY: FOOD INSECURITY: WITHIN THE PAST 12 MONTHS, YOU WORRIED THAT YOUR FOOD WOULD RUN OUT BEFORE YOU GOT MONEY TO BUY MORE.: NEVER TRUE

## 2022-09-19 ASSESSMENT — PATIENT HEALTH QUESTIONNAIRE - PHQ9
2. FEELING DOWN, DEPRESSED OR HOPELESS: 0
SUM OF ALL RESPONSES TO PHQ QUESTIONS 1-9: 7
3. TROUBLE FALLING OR STAYING ASLEEP: 3
7. TROUBLE CONCENTRATING ON THINGS, SUCH AS READING THE NEWSPAPER OR WATCHING TELEVISION: 0
9. THOUGHTS THAT YOU WOULD BE BETTER OFF DEAD, OR OF HURTING YOURSELF: 0
SUM OF ALL RESPONSES TO PHQ9 QUESTIONS 1 & 2: 0
SUM OF ALL RESPONSES TO PHQ QUESTIONS 1-9: 7
SUM OF ALL RESPONSES TO PHQ QUESTIONS 1-9: 7
1. LITTLE INTEREST OR PLEASURE IN DOING THINGS: 0
4. FEELING TIRED OR HAVING LITTLE ENERGY: 1
SUM OF ALL RESPONSES TO PHQ QUESTIONS 1-9: 7
5. POOR APPETITE OR OVEREATING: 3
10. IF YOU CHECKED OFF ANY PROBLEMS, HOW DIFFICULT HAVE THESE PROBLEMS MADE IT FOR YOU TO DO YOUR WORK, TAKE CARE OF THINGS AT HOME, OR GET ALONG WITH OTHER PEOPLE: 0
6. FEELING BAD ABOUT YOURSELF - OR THAT YOU ARE A FAILURE OR HAVE LET YOURSELF OR YOUR FAMILY DOWN: 0
8. MOVING OR SPEAKING SO SLOWLY THAT OTHER PEOPLE COULD HAVE NOTICED. OR THE OPPOSITE, BEING SO FIGETY OR RESTLESS THAT YOU HAVE BEEN MOVING AROUND A LOT MORE THAN USUAL: 0

## 2022-09-19 ASSESSMENT — SOCIAL DETERMINANTS OF HEALTH (SDOH): HOW HARD IS IT FOR YOU TO PAY FOR THE VERY BASICS LIKE FOOD, HOUSING, MEDICAL CARE, AND HEATING?: NOT HARD AT ALL

## 2022-09-19 NOTE — PATIENT INSTRUCTIONS
Mariola Fredy Exercises for Fall Prevention   Stage 1:    1. Warm Up    Breathe in deeply through nose, lift arms above head and stretch. Lower arms and breathe out 6 times. 2. Shoulder rolls (flexibility)    Gently rotate shoulders up to ceiling, backwards, and down. Then reverse: up, forward and down. 6 times each way. 3. March on spot (mobility)    Hold onto chair with 2 hands. Walking on the spot. Try to lift knees a bit higher than usual. Step 10 times with each leg. 4. Ankle (strength)    Hold onto chair. Rise up onto toes of both feet, hold for 5 seconds, then lower. Keep heels on the floor and lift toes off the floor; hold for 5 seconds. Repeat both movements 6 times. 5. Knee bend (strength)    Hold onto chair. Stand with knees soft and back straight. Keeps knees pointing over toes. Bend your knees gently, and then raise your body by straightening your knees. Do this 6 times. 6. Sit to Stand (strength)    Sit in chair against wall. Stand up without using your hands 6 times. If this is too hard, use a pillow on the chair to start until you get stronger. 7. Calf (stretch)    Hold onto chair; stretch one leg behind, toes facing forward, gently bend front knee until you feel a stretch in your calf. Hold stretch for 10 seconds. Do 6 stretches. 8. Calf (stretch)    Hold onto chair, stretch as in previous exercise. When you feel the stretch in your back calf, keep the heel of that foot on the ground, and slightly bend the back knee. Stage 2:     Personalized Preventive Plan for [facility] - 9/19/2022  Medicare offers a range of preventive health benefits. Some of the tests and screenings are paid in full while other may be subject to a deductible, co-insurance, and/or copay. Some of these benefits include a comprehensive review of your medical history including lifestyle, illnesses that may run in your family, and various assessments and screenings as appropriate.     After reviewing your medical record and screening and assessments performed today your provider may have ordered immunizations, labs, imaging, and/or referrals for you. A list of these orders (if applicable) as well as your Preventive Care list are included within your After Visit Summary for your review. Other Preventive Recommendations:    A preventive eye exam performed by an eye specialist is recommended every 1-2 years to screen for glaucoma; cataracts, macular degeneration, and other eye disorders. A preventive dental visit is recommended every 6 months. Try to get at least 150 minutes of exercise per week or 10,000 steps per day on a pedometer . Order or download the FREE \"Exercise & Physical Activity: Your Everyday Guide\" from [employer]. Call 2-562.844.8555 or search [employer] online. You need 7806-9634 mg of calcium and 6200-9772 IU of vitamin D per day. It is possible to meet your calcium requirement with diet alone, but a vitamin D supplement is usually necessary to meet this goal.  When exposed to the sun, use a sunscreen that protects against both UVA and UVB radiation with an SPF of 30 or greater. Reapply every 2 to 3 hours or after sweating, drying off with a towel, or swimming. Always wear a seat belt when traveling in a car. Always wear a helmet when riding a bicycle or motorcycle.

## 2022-09-19 NOTE — PROGRESS NOTES
Medicare Annual Wellness Visit    Lenka Clay is here for Medicare AWV and Medication Refill    Assessment & Plan   Medicare annual wellness visit, subsequent  Acquired hypothyroidism  -     levothyroxine (SYNTHROID) 75 MCG tablet; Take 1 tablet by mouth Daily, Disp-90 tablet, R-1Normal  Mixed hyperlipidemia  -     atorvastatin (LIPITOR) 20 MG tablet; TAKE ONE TABLET BY MOUTH DAILY, Disp-90 tablet, R-1Normal  Bipolar 1 disorder ([address]  -     External Referral to Psychiatry  Primary insomnia  -     temazepam (RESTORIL) 30 MG capsule; Take 1 capsule by mouth nightly as needed for Sleep for up to 90 days. , Disp-90 capsule, R-0Normal    Recommendations for Preventive Services Due: see orders and patient instructions/AVS.    Discussed with patient that temazepam prescription is only prescribed at on appointment. She has been informed of this previously and contract signed. Discussed that if another altercation with the staff occurs about this she will be dismissed from this office  Do not recommend higher dosing of temazepam.  Consider CBT for insomnia. Also there are neurologist to offer further treatments for severe insomnia. Plan for labs at next appointment to check thyroid dosing    Recommended screening schedule for the next 5-10 years is provided to the patient in written form: see Patient Instructions/AVS.     Return in 3 months (on 12/19/2022) for Medicare Annual Wellness Visit in 1 year. Subjective   The following acute and/or chronic problems were also addressed today:    Hypothyroidism-on thyroid replacement. Dose increased , has had weight gain    Secondary hypertension-follows with nephrology. Medications adjusted for low blood pressure and hyperkalemia    Hyperkalemia-she is closely following a low potassium diet but does find this difficult to do.follows with nephrology regularly    Hyperlipidemia- continues atorvastatin; and fenofibrate, trg increaed labs 8/22.  Could not tolerate higher statin doses in past    6/21 diagnosed with atrial fibrillaton/flutter- follows with cardiology; medications being adjusted due to continued elevated HR   on eliquis    Allergic rhinitis-occasional exac, using zyrtec occasinally    Generalized osteoarthritis-follows with rheumatology. She is using diclofenac gel and Tylenol as needed. Considering Cymbalta in the future    Osteopenia- dexa completed 11/21 improved BMD by 15 % from previous    Bipolar disorder-previously managed by psychiatry. on zoloft. Denies symptoms at this time. Needs new psychiatriast      Insomnia- on temazepam for many years , tried many other medications in the past without improvement in symptoms. Reports that sometimes temazepam is not as helpful either    Patient's complete Health Risk Assessment and screening values have been reviewed and are found in Flowsheets. The following problems were reviewed today and where indicated follow up appointments were made and/or referrals ordered.     Positive Risk Factor Screenings with Interventions:    Fall Risk:  Do you feel unsteady or are you worried about falling? : (!) yes  2 or more falls in past year?: no  Fall with injury in past year?: no   Fall Risk Interventions:    Home safety tips provided  Home exercises provided to promote strength and balance            General Health and ACP:  General  In general, how would you say your health is?: Very Good  In the past 7 days, have you experienced any of the following: New or Increased Pain, New or Increased Fatigue, Loneliness, Social Isolation, Stress or Anger?: (!) Yes  Select all that apply: (!) Stress  Do you get the social and emotional support that you need?: Yes  Do you have a Living Will?: Yes    Advance Directives       Power of  Living Will ACP-Advance Directive ACP-Power of     Not on File Filed on 07/13/17 Filed Not on File          General Health Risk Interventions:  Asked her to bring documents to office    Health Habits/Nutrition:  Physical Activity: Insufficiently Active    Days of Exercise per Week: 4 days    Minutes of Exercise per Session: 20 min     Have you lost any weight without trying in the past 3 months?: No  Body mass index: (!) 31.08  Have you seen the dentist within the past year?: Yes  Health Habits/Nutrition Interventions:  Inadequate physical activity:  patient agrees to increase physical activity as follows: working towards 150 min exercise each week     Safety:  Do you have working smoke detectors?: Yes  Do you have any tripping hazards - loose or unsecured carpets or rugs?: No  Do you have any tripping hazards - clutter in doorways, halls, or stairs?: No  Do you have either shower bars, grab bars, non-slip mats or non-slip surfaces in your shower or bathtub?: (!) No  Do all of your stairways have a railing or banister?: Not Applicable  Do you always fasten your seatbelt when you are in a car?: Yes  Safety Interventions:  Home safety tips provided           Objective   Vitals:    09/19/22 1101   BP: 108/64   Site: Left Upper Arm   Position: Sitting   Cuff Size: Large Adult   Pulse: 61   Resp: 18   Temp: 97.6 °F (36.4 °C)   TempSrc: Temporal   SpO2: 97%   Weight: 186 lb 12.8 oz (84.7 kg)   Height: 5' 5\" (1.651 m)      Body mass index is 31.09 kg/m². Physical Exam  Constitutional:       Appearance: Normal appearance. HENT:      Head: Normocephalic and atraumatic. Neurological:      General: No focal deficit present. Mental Status: She is alert and oriented to person, place, and time. Psychiatric:         Attention and Perception: Attention and perception normal.         Mood and Affect: Mood and affect normal.         Speech: Speech normal.         Behavior: Behavior normal. Behavior is cooperative. Thought Content: Thought content normal.         Cognition and Memory: Cognition normal. She exhibits impaired remote memory.          Judgment: Judgment normal.      Comments: Unable to draw clock correctly           Allergies   Allergen Reactions    Penicillins Anaphylaxis    Bactrim [Sulfamethoxazole-Trimethoprim] Rash    Morphine Nausea And Vomiting     Prior to Visit Medications    Medication Sig Taking? Authorizing Provider   digoxin (LANOXIN) 125 MCG tablet digoxin 125 mcg (0.125 mg) tablet Yes Historical Provider, MD   PREMARIN 0.625 MG/GM vaginal cream insert vaginally TWICE A WEEK Yes Historical Provider, MD   metoprolol succinate (TOPROL XL) 50 MG extended release tablet metoprolol succinate ER 50 mg tablet,extended release 24 hr Yes Historical Provider, MD   levothyroxine (SYNTHROID) 75 MCG tablet Take 1 tablet by mouth Daily Yes Sloane Raines MD   temazepam (RESTORIL) 30 MG capsule Take 1 capsule by mouth nightly as needed for Sleep for up to 90 days. Yes Sloane Raines MD   atorvastatin (LIPITOR) 20 MG tablet TAKE ONE TABLET BY MOUTH DAILY Yes Sloane Raines MD   ELIQUIS 5 MG TABS tablet Take 1 tablet by mouth daily Yes Neo Nunez MD   spironolactone (ALDACTONE) 25 MG tablet TAKE ONE TABLET BY MOUTH DAILY Yes Aliyah Colmenares MD   Cholecalciferol (VITAMIN D3 PO) Vitamin D3   250mg PO QHS Yes Historical Provider, MD   melatonin 5 mg TABS tablet melatonin 5 mg tablet   Take 1 tablet every day by oral route at bedtime.  Yes Historical Provider, MD   hyoscyamine (ANASPAZ;LEVSIN) 125 MCG tablet Take 125 mcg by mouth every 4 hours as needed for Cramping Yes Historical Provider, MD   pantoprazole (PROTONIX) 40 MG tablet Take 1 tablet by mouth daily Yes Sloane Raines MD   dilTIAZem CENTENO [facility]) 300 MG extended release capsule Take 1 capsule by mouth daily Yes LEDY Gould - CNP   topiramate (TOPAMAX) 200 MG tablet Take 200 mg by mouth daily Yes Historical Provider, MD   cetirizine (ZYRTEC) 10 MG tablet Take 10 mg by mouth daily Yes Historical Provider, MD   calcium-vitamin D (OSCAL-500) 500-200 MG-UNIT per tablet Take 1 tablet by mouth daily Yes Historical Provider, MD dobsonol (COLESTID) 1 G tablet Take 4 g by mouth 2 times daily  Yes Historical Provider, MD Rajan (Including outside providers/suppliers regularly involved in providing care):   Patient Care Team:  Jenny Schaumann, MD as PCP - General (Family Medicine)  Jenny Schaumann, MD as PCP - [facility] Empaneled Provider  Patricia Jacobs MD as Consulting Physician (Otolaryngology)  Sherley Howard MD as Surgeon (General Surgery)  Beth Essex as Consulting Physician  Milady Iverson MD as Consulting Physician (Gastroenterology)  Patrice Hernandez MD as Consulting Physician (Nephrology)  Selene French MD as Surgeon (Orthopedic Surgery)  Jordy Crum MD as Consulting Physician (Ophthalmology)  Sandro Toth MD as Surgeon (Neurosurgery)     Reviewed and updated this visit:  Tobacco  Allergies  Meds  Problems  Med Hx  Surg Hx  Soc Hx  Fam Hx

## 2022-09-20 NOTE — TELEPHONE ENCOUNTER
Patient is dvery upset over the denial of the refill. She states that she has no new psychiatrist and is not going to one. Than she drove all the way to [facility] to be taken care of by  and her decision to not refill her medication is a bunch of **%*!! She was screaming and cursing .

## 2022-09-20 NOTE — LETTER
[facility]  [address]  Phone: [phone]  Fax: [phone]    9/20/2022    Latasha Bosser  1945  [address] [place] [id number]      Dear Pete Feng,    I regret to inform you that the provider(s) at [facility] will no longer be able to provide your medical care, effective from the date of this letter. I recommend that you immediately find a new provider. I have already provided prescriptions for your current medications based on your past refill history to ensure you will have sufficient medications to last 30 days from the date of this letter. To obtain your medical records, please contact our Medical Records department. You may contact your insurance provider, managed care organization (if applicable) or local Medical Society to obtain a current listing of providers available to provide care. I urge you to see a new provider quickly so that there will be no interruption of your care. I wish you the best in your future medical care. Sincerely,    Alan Ortiz MD  By      Georgi Aschoff, R.DUSTIN.LOUISE., A.M.T. Practice Manager,  [address] [place] [address] Primary Care  [address] [place] [address]  P: [phone]  F: [phone]

## 2022-09-20 NOTE — TELEPHONE ENCOUNTER
Discussed with patient at her visit 9/19/2022 that inappropriate behavior such as this would lead to dismissal from the office. Letter to be written and sent.

## 2022-09-30 ENCOUNTER — TELEPHONE (OUTPATIENT)
Dept: PRIMARY CARE CLINIC | Age: 77
End: 2022-09-30

## 2022-10-20 ENCOUNTER — TELEPHONE (OUTPATIENT)
Dept: PRIMARY CARE CLINIC | Age: 77
End: 2022-10-20

## 2022-10-20 NOTE — TELEPHONE ENCOUNTER
Alona Nieves / Miquel Javier M.D.    Scanned to Media Mgr and faxed to [facility] for further processing.

## 2023-05-27 DIAGNOSIS — E03.9 ACQUIRED HYPOTHYROIDISM: ICD-10-CM

## 2023-05-27 RX ORDER — LEVOTHYROXINE SODIUM 0.07 MG/1
TABLET ORAL
Qty: 90 TABLET | Refills: 1 | OUTPATIENT
Start: 2023-05-27

## 2023-08-24 RX ORDER — APIXABAN 5 MG/1
TABLET, FILM COATED ORAL
Qty: 180 TABLET | Refills: 1 | Status: SHIPPED | OUTPATIENT
Start: 2023-08-24

## 2024-09-25 RX ORDER — APIXABAN 5 MG/1
5 TABLET, FILM COATED ORAL DAILY
Qty: 60 TABLET | Refills: 0 | Status: SHIPPED | OUTPATIENT
Start: 2024-09-25

## 2024-09-25 RX ORDER — APIXABAN 5 MG/1
TABLET, FILM COATED ORAL
Qty: 180 TABLET | Refills: 1 | OUTPATIENT
Start: 2024-09-25